# Patient Record
Sex: FEMALE | Race: WHITE | Employment: OTHER | URBAN - METROPOLITAN AREA
[De-identification: names, ages, dates, MRNs, and addresses within clinical notes are randomized per-mention and may not be internally consistent; named-entity substitution may affect disease eponyms.]

---

## 2024-08-19 ENCOUNTER — APPOINTMENT (OUTPATIENT)
Dept: CT IMAGING | Facility: HOSPITAL | Age: 74
End: 2024-08-19
Attending: EMERGENCY MEDICINE
Payer: MEDICAID

## 2024-08-19 ENCOUNTER — HOSPITAL ENCOUNTER (EMERGENCY)
Facility: HOSPITAL | Age: 74
Discharge: HOME | End: 2024-08-19
Attending: EMERGENCY MEDICINE
Payer: MEDICAID

## 2024-08-19 VITALS
SYSTOLIC BLOOD PRESSURE: 112 MMHG | DIASTOLIC BLOOD PRESSURE: 70 MMHG | TEMPERATURE: 97 F | BODY MASS INDEX: 27.97 KG/M2 | HEART RATE: 70 BPM | RESPIRATION RATE: 18 BRPM | HEIGHT: 62 IN | OXYGEN SATURATION: 98 % | WEIGHT: 152 LBS

## 2024-08-19 DIAGNOSIS — S00.93XA CONTUSION OF HEAD, UNSPECIFIED PART OF HEAD, INITIAL ENCOUNTER: Primary | ICD-10-CM

## 2024-08-19 DIAGNOSIS — S80.00XA CONTUSION OF KNEE, UNSPECIFIED LATERALITY, INITIAL ENCOUNTER: ICD-10-CM

## 2024-08-19 DIAGNOSIS — S20.219A CONTUSION OF CHEST WALL, UNSPECIFIED LATERALITY, INITIAL ENCOUNTER: ICD-10-CM

## 2024-08-19 PROCEDURE — 99284 EMERGENCY DEPT VISIT MOD MDM: CPT

## 2024-08-19 PROCEDURE — 70450 CT HEAD/BRAIN W/O DYE: CPT | Performed by: EMERGENCY MEDICINE

## 2024-08-19 RX ORDER — HYDROCHLOROTHIAZIDE 12.5 MG/1
12.5 TABLET ORAL DAILY
COMMUNITY

## 2024-08-19 RX ORDER — CHOLECALCIFEROL (VITAMIN D3) 50 MCG
TABLET ORAL
COMMUNITY

## 2024-08-19 RX ORDER — ROSUVASTATIN CALCIUM 5 MG/1
5 TABLET, COATED ORAL NIGHTLY
COMMUNITY

## 2024-08-19 RX ORDER — METFORMIN HYDROCHLORIDE EXTENDED-RELEASE TABLETS 1000 MG/1
1000 TABLET, FILM COATED, EXTENDED RELEASE ORAL
COMMUNITY

## 2024-08-19 RX ORDER — AMLODIPINE BESYLATE 5 MG/1
5 TABLET ORAL DAILY
COMMUNITY

## 2024-08-19 RX ORDER — ESCITALOPRAM OXALATE 10 MG/1
10 TABLET ORAL DAILY
COMMUNITY

## 2024-08-19 RX ORDER — ACETAMINOPHEN AND CODEINE PHOSPHATE 300; 30 MG/1; MG/1
1 TABLET ORAL ONCE
Status: COMPLETED | OUTPATIENT
Start: 2024-08-19 | End: 2024-08-19

## 2024-08-19 NOTE — ED INITIAL ASSESSMENT (HPI)
Pt arrives ambulatory to ED after fall three days ago on the sidewalk. Pt now c/o headache, bruising to R side of jaw, and bruising to chest. Pt states she tripped and fell, denies dizziness at the time of the fall. Pt also c/o pain to bilateral knees. Aox4, speaking in full sentences.     Daughter aiding to translate Andorran.

## 2024-08-20 NOTE — ED PROVIDER NOTES
Patient Seen in: [facility] Emergency Department    History     Chief Complaint   Patient presents with    Fall    Headache     Stated Complaint: fall    HPI    Patient here with complaint of head injury.  Injury occurred few days ago fell to ground .  no neck pain, no numbness, tingling or weakness.   no vomiting.  no amnesia.  no abnormal behavior.  Other injuries r chest wall and b knees.  + blood thinners.    Past Medical History:    Depression    Diabetes (HCC)    Essential hypertension    High cholesterol       History reviewed. No pertinent surgical history.         No family history on file.    Social History     Socioeconomic History    Marital status:    Tobacco Use    Smoking status: Never    Smokeless tobacco: Never     Social Determinants of Health     Financial Resource Strain: Medium Risk (4/18/2024)    Received from [facility]    Overall Financial Resource Strain (CARDIA)     Difficulty of Paying Living Expenses: Somewhat hard   Food Insecurity: No Food Insecurity (5/3/2024)    Received from [facility]    Food Insecurity     Within the past 30 days, I worried whether my food would run out before I got money to buy more. / En los últimos 30 días, me preocupó que la comida se podía acabar antes de tener dinero para compr...: Never true / Nunca     Within the past 30 days, the food that I bought just didn't last, and I didn't have money to get more. / En los últimos 30 días, La comida que compré no rindió lo suficiente, y no tenía dinero para...: Never true / Nunca   Transportation Needs: No Transportation Needs (11/18/2020)    Received from [facility]    PRAPARE - Transportation     Lack of Transportation (Medical): No     Lack of Transportation (Non-Medical): No   Physical Activity: Unknown (11/18/2020)    Received from [facility]    Exercise Vital Sign     Days of Exercise per Week: 0 days   Stress: No  Stress Concern Present (11/18/2020)    Received from [facility]    [employer] [place] [employer] - Occupational Stress Questionnaire     Feeling of Stress : Only a little   Social Connections: Unknown (11/18/2020)    Received from [facility]    Social Connection and Isolation Panel [NHANES]     Frequency of Communication with Friends and Family: More than three times a week    Received from [facility]    Housing Stability       Review of Systems    Positive for stated complaint: fall  Other systems are as noted in HPI.  Constitutional and vital signs reviewed.      All other systems reviewed and negative except as noted above.    PSFH elements reviewed from today and agreed except as otherwise stated in HPI.    Physical Exam     ED Triage Vitals [08/19/24 1535]   /79   Pulse 79   Resp 18   Temp 96.8 °F (36 °C)   Temp src Temporal   SpO2 98 %   O2 Device None (Room air)       Current:/70   Pulse 70   Temp 96.8 °F (36 °C) (Temporal)   Resp 18   Ht 157.5 cm (5' 2\")   Wt 68.9 kg   SpO2 98%   BMI 27.80 kg/m²   PULSE OX nl  General: Well appearing in no distress.  Head: r chin bruising noted, tender sup scalp,  no  Alvarado sign/raccoon eyes. no laceration  Eyes: Pupils equal round and reactive to light and accommodation. EOMI.  ENT: Oropharynx clear and patent without malocclusion of the jaw or dental injury.   Neck: neg NEXXUS   Back: no midline tenderness  Resp: no chest tenderness, no resp distres,   CV:  regular rate  Neuro/, A and O x 3.  Normal sensation to light touch in all extremities.  Cranial Nerves: Cranial nerves 2-12 intact  Cerebellar: Normal gait. Normal as tested  Extremities:b knees superficial sts and tenderness no bony deformity ext mech intact  Skin:  no laceration  Psych: Mood and affect normal          ED Course   Labs Reviewed - No data to display  CT BRAIN OR HEAD (CPT=70450)    Result Date:  8/19/2024  CONCLUSION:  1. Negative for depressed calvarial fracture, coup/contrecoup intraparenchymal contusion, intracranial hemorrhage, or further evidence of acute intracranial process by noncontrast CT technique.  2. Senescent changes of parenchymal volume loss with sequela of chronic microvascular ischemic disease. There is also large vessel atherosclerosis.  3. Lesser incidental findings as above.     Dictated by (CST): Jimi Arellano MD on 8/19/2024 at 6:35 PM     Finalized by (CST): Jimi Arellano MD on 8/19/2024 at 6:39 PM           I reviewed CT and noted no intracranial bleeding    MDM     Medical Decision Making  Problems Addressed:  Contusion of chest wall, unspecified laterality, initial encounter: acute illness or injury  Contusion of head, unspecified part of head, initial encounter: acute illness or injury  Contusion of knee, unspecified laterality, initial encounter: acute illness or injury    Amount and/or Complexity of Data Reviewed  Radiology: ordered and independent interpretation performed. Decision-making details documented in ED Course.  Discussion of management or test interpretation with external provider(s): Tylenol, motrin recommended.      Risk  OTC drugs.          Disposition and Plan     Clinical Impression:  1. Contusion of head, unspecified part of head, initial encounter    2. Contusion of chest wall, unspecified laterality, initial encounter    3. Contusion of knee, unspecified laterality, initial encounter        Disposition:  Discharge    Follow-up:  No follow-up provider specified.    Medications Prescribed:  Discharge Medication List as of 8/19/2024  6:58 PM

## 2025-02-17 ENCOUNTER — APPOINTMENT (OUTPATIENT)
Dept: CT IMAGING | Facility: HOSPITAL | Age: 75
End: 2025-02-17
Attending: EMERGENCY MEDICINE
Payer: MEDICAID

## 2025-02-17 ENCOUNTER — ANESTHESIA (OUTPATIENT)
Dept: SURGERY | Facility: HOSPITAL | Age: 75
End: 2025-02-17
Payer: MEDICAID

## 2025-02-17 ENCOUNTER — HOSPITAL ENCOUNTER (OUTPATIENT)
Facility: HOSPITAL | Age: 75
Setting detail: OBSERVATION
Discharge: HOME | End: 2025-02-18
Attending: EMERGENCY MEDICINE | Admitting: HOSPITALIST
Payer: MEDICAID

## 2025-02-17 ENCOUNTER — ANESTHESIA EVENT (OUTPATIENT)
Dept: SURGERY | Facility: HOSPITAL | Age: 75
End: 2025-02-17
Payer: MEDICAID

## 2025-02-17 DIAGNOSIS — K35.80 ACUTE APPENDICITIS, UNSPECIFIED ACUTE APPENDICITIS TYPE: Primary | ICD-10-CM

## 2025-02-17 LAB
ALBUMIN SERPL-MCNC: 4.4 G/DL (ref 3.2–4.8)
ALP LIVER SERPL-CCNC: 63 U/L
ALT SERPL-CCNC: 10 U/L
ANION GAP SERPL CALC-SCNC: 8 MMOL/L (ref 0–18)
AST SERPL-CCNC: 15 U/L (ref ?–34)
BASOPHILS # BLD AUTO: 0.04 X10(3) UL (ref 0–0.2)
BASOPHILS NFR BLD AUTO: 0.7 %
BILIRUB DIRECT SERPL-MCNC: 0.2 MG/DL (ref ?–0.3)
BILIRUB SERPL-MCNC: 0.6 MG/DL (ref 0.2–1.1)
BILIRUB UR QL: NEGATIVE
BUN BLD-MCNC: 15 MG/DL (ref 9–23)
BUN/CREAT SERPL: 24.2 (ref 10–20)
CALCIUM BLD-MCNC: 9.5 MG/DL (ref 8.7–10.4)
CHLORIDE SERPL-SCNC: 101 MMOL/L (ref 98–112)
CO2 SERPL-SCNC: 29 MMOL/L (ref 21–32)
COLOR UR: YELLOW
CREAT BLD-MCNC: 0.62 MG/DL
DEPRECATED RDW RBC AUTO: 43.5 FL (ref 35.1–46.3)
EGFRCR SERPLBLD CKD-EPI 2021: 95 ML/MIN/1.73M2 (ref 60–?)
EOSINOPHIL # BLD AUTO: 0.07 X10(3) UL (ref 0–0.7)
EOSINOPHIL NFR BLD AUTO: 1.2 %
ERYTHROCYTE [DISTWIDTH] IN BLOOD BY AUTOMATED COUNT: 13.1 % (ref 11–15)
EST. AVERAGE GLUCOSE BLD GHB EST-MCNC: 137 MG/DL (ref 68–126)
GLUCOSE BLD-MCNC: 126 MG/DL (ref 70–99)
GLUCOSE BLDC GLUCOMTR-MCNC: 103 MG/DL (ref 70–99)
GLUCOSE BLDC GLUCOMTR-MCNC: 130 MG/DL (ref 70–99)
GLUCOSE BLDC GLUCOMTR-MCNC: 87 MG/DL (ref 70–99)
GLUCOSE BLDC GLUCOMTR-MCNC: 94 MG/DL (ref 70–99)
GLUCOSE BLDC GLUCOMTR-MCNC: 95 MG/DL (ref 70–99)
GLUCOSE UR-MCNC: NORMAL MG/DL
HBA1C MFR BLD: 6.4 % (ref ?–5.7)
HCT VFR BLD AUTO: 38.1 %
HGB BLD-MCNC: 12.7 G/DL
HGB UR QL STRIP.AUTO: NEGATIVE
IMM GRANULOCYTES # BLD AUTO: 0.01 X10(3) UL (ref 0–1)
IMM GRANULOCYTES NFR BLD: 0.2 %
KETONES UR-MCNC: NEGATIVE MG/DL
LEUKOCYTE ESTERASE UR QL STRIP.AUTO: 25
LIPASE SERPL-CCNC: 30 U/L (ref 12–53)
LYMPHOCYTES # BLD AUTO: 1.22 X10(3) UL (ref 1–4)
LYMPHOCYTES NFR BLD AUTO: 20.8 %
MCH RBC QN AUTO: 30 PG (ref 26–34)
MCHC RBC AUTO-ENTMCNC: 33.3 G/DL (ref 31–37)
MCV RBC AUTO: 90.1 FL
MONOCYTES # BLD AUTO: 0.49 X10(3) UL (ref 0.1–1)
MONOCYTES NFR BLD AUTO: 8.4 %
NEUTROPHILS # BLD AUTO: 4.03 X10 (3) UL (ref 1.5–7.7)
NEUTROPHILS # BLD AUTO: 4.03 X10(3) UL (ref 1.5–7.7)
NEUTROPHILS NFR BLD AUTO: 68.7 %
NITRITE UR QL STRIP.AUTO: NEGATIVE
OSMOLALITY SERPL CALC.SUM OF ELEC: 288 MOSM/KG (ref 275–295)
PH UR: 8 [PH] (ref 5–8)
PLATELET # BLD AUTO: 252 10(3)UL (ref 150–450)
POTASSIUM SERPL-SCNC: 4.1 MMOL/L (ref 3.5–5.1)
PROT SERPL-MCNC: 7.1 G/DL (ref 5.7–8.2)
RBC # BLD AUTO: 4.23 X10(6)UL
SODIUM SERPL-SCNC: 138 MMOL/L (ref 136–145)
SP GR UR STRIP: 1.02 (ref 1–1.03)
UROBILINOGEN UR STRIP-ACNC: 2
WBC # BLD AUTO: 5.9 X10(3) UL (ref 4–11)

## 2025-02-17 PROCEDURE — 99223 1ST HOSP IP/OBS HIGH 75: CPT | Performed by: STUDENT IN AN ORGANIZED HEALTH CARE EDUCATION/TRAINING PROGRAM

## 2025-02-17 PROCEDURE — 99222 1ST HOSP IP/OBS MODERATE 55: CPT | Performed by: HOSPITALIST

## 2025-02-17 PROCEDURE — 0DTJ4ZZ RESECTION OF APPENDIX, PERCUTANEOUS ENDOSCOPIC APPROACH: ICD-10-PCS | Performed by: STUDENT IN AN ORGANIZED HEALTH CARE EDUCATION/TRAINING PROGRAM

## 2025-02-17 PROCEDURE — 74177 CT ABD & PELVIS W/CONTRAST: CPT | Performed by: EMERGENCY MEDICINE

## 2025-02-17 PROCEDURE — 44970 LAPAROSCOPY APPENDECTOMY: CPT | Performed by: STUDENT IN AN ORGANIZED HEALTH CARE EDUCATION/TRAINING PROGRAM

## 2025-02-17 RX ORDER — METRONIDAZOLE 500 MG/100ML
500 INJECTION, SOLUTION INTRAVENOUS ONCE
Status: DISCONTINUED | OUTPATIENT
Start: 2025-02-17 | End: 2025-02-17

## 2025-02-17 RX ORDER — ROCURONIUM BROMIDE 10 MG/ML
INJECTION, SOLUTION INTRAVENOUS AS NEEDED
Status: DISCONTINUED | OUTPATIENT
Start: 2025-02-17 | End: 2025-02-17 | Stop reason: SURG

## 2025-02-17 RX ORDER — MORPHINE SULFATE 10 MG/ML
6 INJECTION, SOLUTION INTRAMUSCULAR; INTRAVENOUS EVERY 10 MIN PRN
Status: DISCONTINUED | OUTPATIENT
Start: 2025-02-17 | End: 2025-02-17 | Stop reason: HOSPADM

## 2025-02-17 RX ORDER — METOCLOPRAMIDE HYDROCHLORIDE 5 MG/ML
10 INJECTION INTRAMUSCULAR; INTRAVENOUS EVERY 8 HOURS PRN
Status: DISCONTINUED | OUTPATIENT
Start: 2025-02-17 | End: 2025-02-18

## 2025-02-17 RX ORDER — DEXAMETHASONE SODIUM PHOSPHATE 4 MG/ML
VIAL (ML) INJECTION AS NEEDED
Status: DISCONTINUED | OUTPATIENT
Start: 2025-02-17 | End: 2025-02-17 | Stop reason: SURG

## 2025-02-17 RX ORDER — NICOTINE POLACRILEX 4 MG
30 LOZENGE BUCCAL
Status: DISCONTINUED | OUTPATIENT
Start: 2025-02-17 | End: 2025-02-18

## 2025-02-17 RX ORDER — ONDANSETRON 2 MG/ML
4 INJECTION INTRAMUSCULAR; INTRAVENOUS EVERY 6 HOURS PRN
Status: DISCONTINUED | OUTPATIENT
Start: 2025-02-17 | End: 2025-02-17 | Stop reason: HOSPADM

## 2025-02-17 RX ORDER — ONDANSETRON 2 MG/ML
4 INJECTION INTRAMUSCULAR; INTRAVENOUS EVERY 6 HOURS PRN
Status: DISCONTINUED | OUTPATIENT
Start: 2025-02-17 | End: 2025-02-18

## 2025-02-17 RX ORDER — ONDANSETRON 2 MG/ML
4 INJECTION INTRAMUSCULAR; INTRAVENOUS ONCE
Status: COMPLETED | OUTPATIENT
Start: 2025-02-17 | End: 2025-02-17

## 2025-02-17 RX ORDER — OXYCODONE HYDROCHLORIDE 5 MG/1
5 TABLET ORAL EVERY 4 HOURS PRN
Qty: 5 TABLET | Refills: 0 | Status: SHIPPED | OUTPATIENT
Start: 2025-02-17

## 2025-02-17 RX ORDER — ESCITALOPRAM OXALATE 10 MG/1
10 TABLET ORAL DAILY
Status: DISCONTINUED | OUTPATIENT
Start: 2025-02-17 | End: 2025-02-18

## 2025-02-17 RX ORDER — AMLODIPINE BESYLATE 5 MG/1
5 TABLET ORAL DAILY
Status: DISCONTINUED | OUTPATIENT
Start: 2025-02-17 | End: 2025-02-18

## 2025-02-17 RX ORDER — LIDOCAINE HYDROCHLORIDE 10 MG/ML
INJECTION, SOLUTION EPIDURAL; INFILTRATION; INTRACAUDAL; PERINEURAL AS NEEDED
Status: DISCONTINUED | OUTPATIENT
Start: 2025-02-17 | End: 2025-02-17 | Stop reason: SURG

## 2025-02-17 RX ORDER — MORPHINE SULFATE 4 MG/ML
4 INJECTION, SOLUTION INTRAMUSCULAR; INTRAVENOUS EVERY 10 MIN PRN
Status: DISCONTINUED | OUTPATIENT
Start: 2025-02-17 | End: 2025-02-17 | Stop reason: HOSPADM

## 2025-02-17 RX ORDER — NICOTINE POLACRILEX 4 MG
15 LOZENGE BUCCAL
Status: DISCONTINUED | OUTPATIENT
Start: 2025-02-17 | End: 2025-02-18

## 2025-02-17 RX ORDER — HYDROMORPHONE HYDROCHLORIDE 1 MG/ML
0.4 INJECTION, SOLUTION INTRAMUSCULAR; INTRAVENOUS; SUBCUTANEOUS EVERY 5 MIN PRN
Status: DISCONTINUED | OUTPATIENT
Start: 2025-02-17 | End: 2025-02-17 | Stop reason: HOSPADM

## 2025-02-17 RX ORDER — NALOXONE HYDROCHLORIDE 0.4 MG/ML
80 INJECTION, SOLUTION INTRAMUSCULAR; INTRAVENOUS; SUBCUTANEOUS AS NEEDED
Status: DISCONTINUED | OUTPATIENT
Start: 2025-02-17 | End: 2025-02-17 | Stop reason: HOSPADM

## 2025-02-17 RX ORDER — MORPHINE SULFATE 2 MG/ML
2 INJECTION, SOLUTION INTRAMUSCULAR; INTRAVENOUS EVERY 2 HOUR PRN
Status: DISCONTINUED | OUTPATIENT
Start: 2025-02-17 | End: 2025-02-17

## 2025-02-17 RX ORDER — ACETAMINOPHEN 500 MG
500 TABLET ORAL EVERY 4 HOURS PRN
Status: DISCONTINUED | OUTPATIENT
Start: 2025-02-17 | End: 2025-02-18

## 2025-02-17 RX ORDER — ACETAMINOPHEN 500 MG
750 TABLET ORAL EVERY 8 HOURS
Status: DISCONTINUED | OUTPATIENT
Start: 2025-02-17 | End: 2025-02-18

## 2025-02-17 RX ORDER — HYDROMORPHONE HYDROCHLORIDE 1 MG/ML
0.3 INJECTION, SOLUTION INTRAMUSCULAR; INTRAVENOUS; SUBCUTANEOUS EVERY 4 HOURS PRN
Status: DISCONTINUED | OUTPATIENT
Start: 2025-02-17 | End: 2025-02-18

## 2025-02-17 RX ORDER — ACETAMINOPHEN 500 MG
750 TABLET ORAL EVERY 4 HOURS PRN
Qty: 40 TABLET | Refills: 0 | Status: SHIPPED | OUTPATIENT
Start: 2025-02-17

## 2025-02-17 RX ORDER — MORPHINE SULFATE 4 MG/ML
2 INJECTION, SOLUTION INTRAMUSCULAR; INTRAVENOUS EVERY 10 MIN PRN
Status: DISCONTINUED | OUTPATIENT
Start: 2025-02-17 | End: 2025-02-17 | Stop reason: HOSPADM

## 2025-02-17 RX ORDER — HYDROMORPHONE HYDROCHLORIDE 1 MG/ML
0.6 INJECTION, SOLUTION INTRAMUSCULAR; INTRAVENOUS; SUBCUTANEOUS EVERY 5 MIN PRN
Status: DISCONTINUED | OUTPATIENT
Start: 2025-02-17 | End: 2025-02-17 | Stop reason: HOSPADM

## 2025-02-17 RX ORDER — MEPERIDINE HYDROCHLORIDE 25 MG/ML
12.5 INJECTION INTRAMUSCULAR; INTRAVENOUS; SUBCUTANEOUS AS NEEDED
Status: DISCONTINUED | OUTPATIENT
Start: 2025-02-17 | End: 2025-02-17 | Stop reason: HOSPADM

## 2025-02-17 RX ORDER — OXYCODONE HYDROCHLORIDE 5 MG/1
5 TABLET ORAL EVERY 4 HOURS PRN
Status: DISCONTINUED | OUTPATIENT
Start: 2025-02-17 | End: 2025-02-18

## 2025-02-17 RX ORDER — IBUPROFEN 200 MG
400 TABLET ORAL EVERY 6 HOURS PRN
COMMUNITY
End: 2025-02-18

## 2025-02-17 RX ORDER — METOCLOPRAMIDE HYDROCHLORIDE 5 MG/ML
10 INJECTION INTRAMUSCULAR; INTRAVENOUS EVERY 8 HOURS PRN
Status: DISCONTINUED | OUTPATIENT
Start: 2025-02-17 | End: 2025-02-17 | Stop reason: HOSPADM

## 2025-02-17 RX ORDER — HYDROMORPHONE HYDROCHLORIDE 1 MG/ML
0.2 INJECTION, SOLUTION INTRAMUSCULAR; INTRAVENOUS; SUBCUTANEOUS EVERY 5 MIN PRN
Status: DISCONTINUED | OUTPATIENT
Start: 2025-02-17 | End: 2025-02-17 | Stop reason: HOSPADM

## 2025-02-17 RX ORDER — SODIUM CHLORIDE, SODIUM LACTATE, POTASSIUM CHLORIDE, CALCIUM CHLORIDE 600; 310; 30; 20 MG/100ML; MG/100ML; MG/100ML; MG/100ML
INJECTION, SOLUTION INTRAVENOUS CONTINUOUS
Status: DISCONTINUED | OUTPATIENT
Start: 2025-02-17 | End: 2025-02-17 | Stop reason: HOSPADM

## 2025-02-17 RX ORDER — OMEGA-3 FATTY ACIDS/FISH OIL 300-1000MG
400 CAPSULE ORAL EVERY 6 HOURS PRN
Qty: 40 CAPSULE | Refills: 0 | Status: SHIPPED | OUTPATIENT
Start: 2025-02-17 | End: 2025-03-19

## 2025-02-17 RX ORDER — TEMAZEPAM 15 MG/1
15 CAPSULE ORAL NIGHTLY PRN
Status: DISCONTINUED | OUTPATIENT
Start: 2025-02-17 | End: 2025-02-18

## 2025-02-17 RX ORDER — SODIUM CHLORIDE, SODIUM LACTATE, POTASSIUM CHLORIDE, CALCIUM CHLORIDE 600; 310; 30; 20 MG/100ML; MG/100ML; MG/100ML; MG/100ML
INJECTION, SOLUTION INTRAVENOUS CONTINUOUS PRN
Status: DISCONTINUED | OUTPATIENT
Start: 2025-02-17 | End: 2025-02-17 | Stop reason: SURG

## 2025-02-17 RX ORDER — ROSUVASTATIN CALCIUM 5 MG/1
5 TABLET, COATED ORAL NIGHTLY
Status: DISCONTINUED | OUTPATIENT
Start: 2025-02-17 | End: 2025-02-18

## 2025-02-17 RX ORDER — BUPIVACAINE HYDROCHLORIDE AND EPINEPHRINE 2.5; 5 MG/ML; UG/ML
INJECTION, SOLUTION INFILTRATION; PERINEURAL AS NEEDED
Status: DISCONTINUED | OUTPATIENT
Start: 2025-02-17 | End: 2025-02-17 | Stop reason: HOSPADM

## 2025-02-17 RX ORDER — SODIUM CHLORIDE 9 MG/ML
INJECTION, SOLUTION INTRAVENOUS CONTINUOUS
Status: DISCONTINUED | OUTPATIENT
Start: 2025-02-17 | End: 2025-02-18

## 2025-02-17 RX ORDER — METRONIDAZOLE 500 MG/100ML
500 INJECTION, SOLUTION INTRAVENOUS EVERY 12 HOURS
Status: DISCONTINUED | OUTPATIENT
Start: 2025-02-17 | End: 2025-02-17

## 2025-02-17 RX ORDER — MORPHINE SULFATE 2 MG/ML
1 INJECTION, SOLUTION INTRAMUSCULAR; INTRAVENOUS EVERY 2 HOUR PRN
Status: DISCONTINUED | OUTPATIENT
Start: 2025-02-17 | End: 2025-02-17

## 2025-02-17 RX ORDER — SODIUM CHLORIDE 9 MG/ML
INJECTION, SOLUTION INTRAVENOUS ONCE
Status: COMPLETED | OUTPATIENT
Start: 2025-02-17 | End: 2025-02-18

## 2025-02-17 RX ORDER — DEXTROSE MONOHYDRATE 25 G/50ML
50 INJECTION, SOLUTION INTRAVENOUS
Status: DISCONTINUED | OUTPATIENT
Start: 2025-02-17 | End: 2025-02-18

## 2025-02-17 RX ORDER — MORPHINE SULFATE 4 MG/ML
4 INJECTION, SOLUTION INTRAMUSCULAR; INTRAVENOUS EVERY 2 HOUR PRN
Status: DISCONTINUED | OUTPATIENT
Start: 2025-02-17 | End: 2025-02-17

## 2025-02-17 RX ADMIN — ROCURONIUM BROMIDE 50 MG: 10 INJECTION, SOLUTION INTRAVENOUS at 19:29:00

## 2025-02-17 RX ADMIN — LIDOCAINE HYDROCHLORIDE 25 MG: 10 INJECTION, SOLUTION EPIDURAL; INFILTRATION; INTRACAUDAL; PERINEURAL at 19:25:00

## 2025-02-17 RX ADMIN — ONDANSETRON 4 MG: 2 INJECTION INTRAMUSCULAR; INTRAVENOUS at 19:39:00

## 2025-02-17 RX ADMIN — DEXAMETHASONE SODIUM PHOSPHATE 4 MG: 4 MG/ML VIAL (ML) INJECTION at 19:39:00

## 2025-02-17 RX ADMIN — SODIUM CHLORIDE, SODIUM LACTATE, POTASSIUM CHLORIDE, CALCIUM CHLORIDE: 600; 310; 30; 20 INJECTION, SOLUTION INTRAVENOUS at 19:21:00

## 2025-02-17 NOTE — ED PROVIDER NOTES
Patient Seen in: [facility] Emergency Department    History     Chief Complaint   Patient presents with    Abdomen/Flank Pain     Stated Complaint: abd pain    HPI      History of present illness:   Pt complains of RLQ pain that began 3 days ago.  Pain rated as 7/10, sharp.  + nausea. No vomiting or diarrhea.  No fever.  Decreased appetitie.      Past Medical History:    Depression    Diabetes (HCC)    Essential hypertension    High cholesterol    Osteoarthritis       Past Surgical History:   Procedure Laterality Date    Total knee replacement Left 2020            Family History   Problem Relation Age of Onset    Cancer Father         skin cancer    Diabetes Mother     Hypertension Sister     Diabetes Sister     Diabetes Brother        Social History     Socioeconomic History    Marital status:    Tobacco Use    Smoking status: Never    Smokeless tobacco: Never   Substance and Sexual Activity    Drug use: Not Currently     Social Drivers of Health     Food Insecurity: No Food Insecurity (2/17/2025)    NCSS - Food Insecurity     Worried About Running Out of Food in the Last Year: No     Ran Out of Food in the Last Year: No   Transportation Needs: No Transportation Needs (2/17/2025)    NCSS - Transportation     Lack of Transportation: No   Housing Stability: Not At Risk (2/17/2025)    NCSS - Housing/Utilities     Has Housing: Yes     Worried About Losing Housing: No     Unable to Get Utilities: No       Review of Systems    Positive for stated complaint: abd pain  Other systems are as noted in HPI.  Constitutional and vital signs reviewed.      All other systems reviewed and negative except as noted above.    PSFH elements reviewed from today and agreed except as otherwise stated in HPI.    Physical Exam     ED Triage Vitals [02/17/25 1006]   /88   Pulse 82   Resp 18   Temp 97.7 °F (36.5 °C)   Temp src Oral   SpO2 98 %   O2 Device None (Room air)       Current:/59 (BP Location: Right arm)    Pulse 72   Temp 98.2 °F (36.8 °C) (Oral)   Resp 18   Ht 157.5 cm (5' 2\")   Wt 62.6 kg   SpO2 96%   BMI 25.24 kg/m²             General Appearance: alert, no distress  Eyes: pupils equal and round no pallor or injection  ENT, Mouth: mucous membranes moist  Respiratory: there are no retractions, lungs are clear to auscultation  Cardiovascular: regular rate and rhythm    Gastrointestinal:  soft, nondistended, tender over McBurney's, mild voluntary guarding, no masses normal bowel sounds   Neurological: II-XII grossly intact  no focal deficits  Skin: warm and dry, no rashes.  Musculoskeletal: neck is supple non tender        Extremities are symmetrical, full range of motion  Psychiatric: patient is pleasant, there is no agitation    DIFFERENTIAL DIAGNOSIS: After history and physical exam differential diagnosis was considered for mesenteric adenitis versus appendicitis versus colitis       ED Course     Labs Reviewed   BASIC METABOLIC PANEL (8) - Abnormal; Notable for the following components:       Result Value    Glucose 126 (*)     BUN/CREA Ratio 24.2 (*)     All other components within normal limits   URINALYSIS WITH CULTURE REFLEX - Abnormal; Notable for the following components:    Clarity Urine Turbid (*)     Protein Urine Trace (*)     Urobilinogen Urine 2 (*)     Leukocyte Esterase Urine 25 (*)     Squamous Epi. Cells Few (*)     All other components within normal limits   HEMOGLOBIN A1C - Abnormal; Notable for the following components:    HgbA1C 6.4 (*)     Estimated Average Glucose 137 (*)     All other components within normal limits   HEPATIC FUNCTION PANEL (7) - Normal   LIPASE - Normal   POCT GLUCOSE - Normal   POCT GLUCOSE - Normal   POCT GLUCOSE - Normal   CBC WITH DIFFERENTIAL WITH PLATELET   URINE CULTURE, ROUTINE   C. DIFFICILE(TOXIGENIC)PCR       MDM         Monitor Interpretation:  Nsr 78    Radiology Interpretation:  No results found.  CT acute appendicitis    Medical Decision  Making  Problems Addressed:  Acute appendicitis, unspecified acute appendicitis type: acute illness or injury    Amount and/or Complexity of Data Reviewed  Radiology: ordered. Decision-making details documented in ED Course.  Discussion of management or test interpretation with external provider(s): Dr Arreguin in ER for admission, spoke with gen surgery abx ordered    Risk  Decision regarding hospitalization.        Disposition and Plan     Clinical Impression:  1. Acute appendicitis, unspecified acute appendicitis type        Disposition:  Admit    Follow-up:  [facility]  [address]  [facility] [address]  [phone]    Follow up in 2 week(s)        Medications Prescribed:  Current Discharge Medication List          Present on Admission:  **None**

## 2025-02-17 NOTE — H&P
[facility]    PATIENT'S NAME: LEONARDA GALVEZ   ATTENDING PHYSICIAN: Vince Ta MD   PATIENT ACCOUNT#:   [id number]    LOCATION:  [address]  MEDICAL RECORD #:   [id number]       YOB: 1950  ADMISSION DATE:       02/17/2025    HISTORY AND PHYSICAL EXAMINATION    CHIEF COMPLAINT:  Acute appendicitis.    HISTORY OF PRESENT ILLNESS:  Patient is a 74-year-old  female who came into the emergency department for evaluation of 3 days of right lower quadrant abdominal pain.  CBC, chemistry, urinalysis, and liver function tests were unremarkable.  CT scan of the abdomen and pelvis showed acute uncomplicated appendicitis.  Started on IV antibiotics, and she will be admitted to the hospital for further management.     PAST MEDICAL HISTORY:  Hypertension, hyperlipidemia, diabetes mellitus type 2, osteoarthritis, depression.    PAST SURGICAL HISTORY:  None.    MEDICATIONS:  Please see medication reconciliation list.     ALLERGIES:  No known drug allergies.    FAMILY HISTORY:  Positive for diabetes mellitus type 2 and hypertension.    SOCIAL HISTORY:  No tobacco or drug use.  Occasional alcohol.  Independent for basic activities of daily living.     REVIEW OF SYSTEMS:  Right lower quadrant abdominal pain initially started in the mid abdomen and lateralized to the right lower quadrant yesterday.  No fever or chills but she had malaise, fatigue, and decreased appetite.  Other 12-point review of systems is negative.       PHYSICAL EXAMINATION:    GENERAL:  Alert and oriented to time, place and person.  Anxious.  Mild distress.   VITAL SIGNS:  Temperature 97.7, pulse 82, respiratory rate 18, blood pressure 142/88, pulse ox 98% on room air.   HEENT:  Atraumatic.  Oropharynx clear.  Moist mucous membranes.  Ears and nose normal.  Eyes:  Anicteric sclerae.   NECK:  Supple.  No lymphadenopathy.  Trachea midline.  Full range of motion.   LUNGS:  Clear to auscultation bilaterally.  Normal  respiratory effort.    HEART:  Regular rate and rhythm.  S1 and S2 auscultated.  No murmur.    ABDOMEN:  Soft, nondistended.  Tenderness noted, right lower quadrant area.  No guarding or rebound tenderness.  EXTREMITIES:  No peripheral edema, clubbing or cyanosis.   NEUROLOGIC:  Motor and sensory intact.      ASSESSMENT:    1.   Acute appendicitis, uncomplicated.  2.   Diabetes mellitus type 2.  3.   Hypertension.    PLAN:  Patient will be admitted to general medical floor.  IV antibiotics, Rocephin and Flagyl.  Pain and nausea control.  N.p.o.  General surgery consult to evaluate patient for laparoscopic appendectomy.  Monitor Accu-Cheks.  Further recommendations to follow.      Dictated By Иван Arreguin MD  d: 02/17/2025 13:25:33  t: 02/17/2025 14:16:47  [facility] [id number]  FB/

## 2025-02-17 NOTE — ED INITIAL ASSESSMENT (HPI)
Pt having RLQ abd pain radiating to LT side abd and epigastric, fevers and n/v starting on Friday.

## 2025-02-17 NOTE — CONSULTS
[facility]  Report of Consultation    Kristin Hebert Patient Status:  Emergency    1950 MRN [id number]   Location [facility] EMERGENCY DEPARTMENT Attending Vince Ta MD   Hosp Day # 0 PCP Cody Llamas     Reason for Consultation:  Acute appendicitis    History of Present Illness:  Kristin Hebert is a 74 year old female who presents to [facility] on 2025 with complaints of RLQ abdominal pain associated with nausea and vomiting. She has had constant pain for the past 3 days. She reports feeling warm but never checked her temperature at home. She hasn't felt a pain like this before. CT A/P consistent with acute appendicitis. WBC 5.9, afebrile.     Past medical history HTN, DM    Past abdominal surgical history none    History:  Past Medical History:    Depression    Diabetes (HCC)    Essential hypertension    High cholesterol     History reviewed. No pertinent surgical history.  History reviewed. No pertinent family history.   reports that she has never smoked. She has never used smokeless tobacco.    Allergies:  Allergies[1]    Medications:  (Not in a hospital admission)        Current Facility-Administered Medications:     sodium chloride 0.9% infusion, , Intravenous, Once    cefTRIAXone (Rocephin) 2 g in sodium chloride 0.9% 100 mL IVPB-ADDV, 2 g, Intravenous, Once    metroNIDAZOLE in sodium chloride 0.79% (Flagyl) 5 mg/mL IVPB premix 500 mg, 500 mg, Intravenous, Once    Review of Systems:  Review of Systems   Constitutional:  Positive for appetite change. Negative for chills, diaphoresis and fever.   Respiratory:  Negative for shortness of breath.    Cardiovascular:  Negative for chest pain.   Gastrointestinal:  Positive for abdominal pain. Negative for nausea, vomiting, diarrhea, constipation and abdominal distention.   Neurological:  Negative for weakness.       Physical Exam:  /88   Pulse 82   Temp 97.7 °F (36.5 °C) (Oral)   Resp 18   Ht 5' 2\"  (1.575 m)   Wt 151 lb 14.4 oz (68.9 kg)   SpO2 98%   BMI 27.78 kg/m²   Physical Exam  Vitals reviewed.   Constitutional:       General: She is not in acute distress.     Appearance: Normal appearance.   HENT:      Head: Normocephalic and atraumatic.      Right Ear: External ear normal.      Left Ear: External ear normal.   Pulmonary:      Effort: Pulmonary effort is normal. No respiratory distress.   Abdominal:      General: There is no distension.      Palpations: Abdomen is soft.      Tenderness: There is abdominal tenderness (RLQ). There is no guarding or rebound.   Neurological:      Mental Status: She is alert and oriented to person, place, and time.   Psychiatric:         Mood and Affect: Mood normal.         Behavior: Behavior normal.         Thought Content: Thought content normal.         Judgment: Judgment normal.         Laboratory Data:  Recent Labs   Lab 02/17/25  1119   RBC 4.23   HGB 12.7   HCT 38.1   MCV 90.1   MCH 30.0   MCHC 33.3   RDW 13.1   NEPRELIM 4.03   WBC 5.9   .0       Recent Labs   Lab 02/17/25  1119   *   BUN 15   CREATSERUM 0.62   CA 9.5   ALB 4.4      K 4.1      CO2 29.0   ALKPHO 63   AST 15   ALT 10   BILT 0.6   TP 7.1         No results for input(s): \"PTP\", \"INR\", \"PTT\" in the last 168 hours.      No results found.    Medical Decision Making         Impression:  Patient Active Problem List   Diagnosis    Acute appendicitis, unspecified acute appendicitis type       Acute appendicitis    Plan:  Plan laparoscopic appendectomy pending OR availability  Keep NPO prior to procedure  Continue IV abx  Analgesics and antiemetics as needed  Dr. Lee to see and provide additional recommendations as needed    Wenceslao Whatley PA-C  2/17/2025  1:28 PM                           [1] No Known Allergies

## 2025-02-18 VITALS
HEART RATE: 71 BPM | WEIGHT: 138 LBS | HEIGHT: 62 IN | BODY MASS INDEX: 25.4 KG/M2 | OXYGEN SATURATION: 98 % | RESPIRATION RATE: 18 BRPM | SYSTOLIC BLOOD PRESSURE: 120 MMHG | TEMPERATURE: 98 F | DIASTOLIC BLOOD PRESSURE: 70 MMHG

## 2025-02-18 LAB
ANION GAP SERPL CALC-SCNC: 11 MMOL/L (ref 0–18)
BASOPHILS # BLD AUTO: 0.02 X10(3) UL (ref 0–0.2)
BASOPHILS NFR BLD AUTO: 0.3 %
BUN BLD-MCNC: 9 MG/DL (ref 9–23)
BUN/CREAT SERPL: 16.4 (ref 10–20)
CALCIUM BLD-MCNC: 9.1 MG/DL (ref 8.7–10.4)
CHLORIDE SERPL-SCNC: 102 MMOL/L (ref 98–112)
CO2 SERPL-SCNC: 24 MMOL/L (ref 21–32)
CREAT BLD-MCNC: 0.55 MG/DL
DEPRECATED RDW RBC AUTO: 41.1 FL (ref 35.1–46.3)
EGFRCR SERPLBLD CKD-EPI 2021: 97 ML/MIN/1.73M2 (ref 60–?)
EOSINOPHIL # BLD AUTO: 0 X10(3) UL (ref 0–0.7)
EOSINOPHIL NFR BLD AUTO: 0 %
ERYTHROCYTE [DISTWIDTH] IN BLOOD BY AUTOMATED COUNT: 12.7 % (ref 11–15)
GLUCOSE BLD-MCNC: 132 MG/DL (ref 70–99)
GLUCOSE BLDC GLUCOMTR-MCNC: 127 MG/DL (ref 70–99)
GLUCOSE BLDC GLUCOMTR-MCNC: 180 MG/DL (ref 70–99)
GLUCOSE BLDC GLUCOMTR-MCNC: 229 MG/DL (ref 70–99)
HCT VFR BLD AUTO: 36.2 %
HGB BLD-MCNC: 12.5 G/DL
IMM GRANULOCYTES # BLD AUTO: 0.02 X10(3) UL (ref 0–1)
IMM GRANULOCYTES NFR BLD: 0.3 %
LYMPHOCYTES # BLD AUTO: 0.93 X10(3) UL (ref 1–4)
LYMPHOCYTES NFR BLD AUTO: 13.4 %
MCH RBC QN AUTO: 30.5 PG (ref 26–34)
MCHC RBC AUTO-ENTMCNC: 34.5 G/DL (ref 31–37)
MCV RBC AUTO: 88.3 FL
MONOCYTES # BLD AUTO: 0.19 X10(3) UL (ref 0.1–1)
MONOCYTES NFR BLD AUTO: 2.7 %
NEUTROPHILS # BLD AUTO: 5.8 X10 (3) UL (ref 1.5–7.7)
NEUTROPHILS # BLD AUTO: 5.8 X10(3) UL (ref 1.5–7.7)
NEUTROPHILS NFR BLD AUTO: 83.3 %
OSMOLALITY SERPL CALC.SUM OF ELEC: 285 MOSM/KG (ref 275–295)
PLATELET # BLD AUTO: 268 10(3)UL (ref 150–450)
POTASSIUM SERPL-SCNC: 4 MMOL/L (ref 3.5–5.1)
RBC # BLD AUTO: 4.1 X10(6)UL
SODIUM SERPL-SCNC: 137 MMOL/L (ref 136–145)
WBC # BLD AUTO: 7 X10(3) UL (ref 4–11)

## 2025-02-18 PROCEDURE — 99239 HOSP IP/OBS DSCHRG MGMT >30: CPT | Performed by: HOSPITALIST

## 2025-02-18 NOTE — DISCHARGE INSTRUCTIONS
INSTRUCCIONES POSTOPERATORIAS PARA CIRUGÍA LAPAROSCÓPICA/ROBOTIZADA  Muchas dilshad por permitirme participar en zuniga atención, es un verdadero privilegio. A continuación, encontrará las órdenes de lebron que le ayudarán bethany zuniga recuperación. No dude en llamar a la oficina al [phone] si tiene alguna pregunta. Después de horas, el mismo número le permitirá comunicarse con el cirujano de james.    o Llame a la oficina [phone] para programar zuniga sascha de seguimiento postoperatorio con luis armando Lee para dentro de 2 semanas, si aún no está programada.    o (Puede ser necesario que lo cristy antes de 2 semanas si tiene puntos y/o drenajes presentes)    o  Cambie los vendajes diariamente o con más frecuencia si es necesario.    o Mantenga las incisiones limpias con jabón y agua a diario.    o Cubra la(s) incisión(es) según sea necesario.    o Por favor, retire las tiras adhesivas (Steri-Strips) 5 días después de la cirugía. Cucumber incluye cualquier vendaje transparente y gasa colocada en el ombligo, si es aplicable.    o Si tiene pegamento para la piel, gera se caerá solo.    o  Puede colocar keny bolsa de hielo sobre melvin incisiones de manera intermitente (15 minutos) bethany las próximas 24-48 horas.    o  Reanude zuniga dieta habitual según lo tolere (se recomienda comenzar con líquidos).    o  Pautas generales para la actividad:    - Evite actividades extenuantes o levantar objetos que pesen más de 15 libras.     - Está destiny levantarse y caminar. También está destiny subir y bajar escaleras.    - Zach lo que le resulte cómodo: deténgase y descanse cuando se sienta cansado.    O Está destiny ducharse después de 24 horas.    o Tendrá medicamentos para el dolor prescritos. Tómelo según las indicaciones/necesidades.    o Algunas molestias, moretones leves e hinchazón no son inusuales; por favor llame a mi oficina si tiene un dolor severo, sangrado o fiebre lebron (superior a 101°F).    o Behtany el procedimiento  robótico/laparoscópico que tuvo, se introduce gas en la cavidad abdominal. Puede sentir dolor abdominal, en los hombros o en las costillas bethany unos días debido a esto.    o Reanude melvin medicamentos habituales (consulte la hoja de conciliación de medicamentos).    o NO conduzca bethany un día y mientras esté tomando zuniga medicamento narcótico para el dolor.    O Observe los signos de infección:   - Calor excesivo o enrojecimiento brillante alrededor de melvin incisiones.   - Filtración de líquido sanguinolento o turbio de melvin incisiones.   - Fiebre superior a 100.5.   - Si experimenta estreñimiento:    o Aumente zuniga ingesta de agua.    o Aumente zuniga actividad; caminar es lo mejor.    o Un ablandador de heces de venta bhargav o un laxante suave pueden ser necesarios si no ha tenido keny evacuación intestinal después de varios días.    Por favor, llame a la oficina al [phone] si tiene alguna pregunta y para programar zuniga sascha postoperatoria si es necesario. Elisa nuevamente por permitirme participar en zuniga atención, ¡y que se recupere pronto!        Kaitlin Lee MD  [facility] - [address]  p [phone]      POST-OPERATIVE INSTRUCTIONS LAPAROSCOPIC/ROBOTIC SURGERY    Thank you very much for allowing me to participate in your care, it is truly a privilege. Below please see discharge orders that will help you during your recovery. Please do not hesitate to call the office at [phone] for any questions. After hours, the same number will allow you to reach the on-call surgeon.     Call the office [phone] to schedule your 2 week post-operative appointment with Dr. Lee or her Physician Assistant (PA).    Change bandages daily or more frequently if needed.  Keep incisions clean with soap/ water daily.   Cover incision(s) as needed.  If you have skin glue this will come off on its own    May place an ice pack over your incisions on and off (15min) at a  time for the next 24-48 hours.    Resume regular diet as tolerated (recommend starting with liquids)    General guidelines for activity:      Avoid strenuous activity or lifting anything heavier than 15 pounds.    It is OK to be up and walking around. Going up and down stairs is also OK.    Do what is comfortable: stop and rest when you feel tired.    It is OK to shower after 24 hours    You will have pain medicine ordered. Take as directed/needed.    Some discomfort, mild bruising, and swelling are not unusual; please call my office if you have any severe pain, bleeding, or high fever (over 101°F)    During the robotic/laparoscopic procedure that you had, gas is pumped into the abdominal cavity.  You may feel abdominal, shoulder, or rib pain for a few days due to this.    Resume home medications (see medication reconciliation sheet)       Do NOT drive for one day and while taking your narcotic pain medicine.        Watch for signs of infection:    Excessive warmth or bright redness around your incisions    Leakage of bloody or cloudy fluid from you incisions    Fever over 100.5    If you experience constipation  Increase your water intake.  Increase your activity; walking is best.  An over the counter stool softener or mild laxative may be necessary if you still have not had a bowel movement after several days.       Please call the office at [phone] for any questions and to make your post op appointment if needed. Thank you again for allowing me to participate in your care, and get well soon!      Kaitlin Lee MD  [facility] - General Surgery   [address]  p [phone]

## 2025-02-18 NOTE — OPERATIVE REPORT
OPERATIVE NOTE    PATIENT NAME: Kristin Hebert    :  1950    MRN:  [id number]  ATTENDING PHYSICIAN:  Иван Arreguin MD    PROCEDURE DATE:  2025       PREOPERATIVE DIAGNOSIS: Acute appendicitis     POSTOPERATIVE DIAGNOSIS: Acute suppurative appendicitis     SURGEON: Kaitlin Lee MD    ASSISTANT: PRADIP Bruno     OPERATION: 1) Laparoscopic appendectomy    ANESTHESIA: General    ESTIMATED BLOOD LOSS:  5 ml    COMPLICATIONS: none     SPECIMENS: Appendix    INDICATIONS: The patient is a 74 year old year old female with history of above preop diagnosis.  I explained the risk, benefits, expected outcome, and alternatives to the procedure. Patient understands the risks include but not inclusive to bleeding, infection, anesthesia complication, blood vessel/nerve damage, chronic pain, reoperation, and failure of the procedure to obtain its intended goals.  Patient understands and is in agreement and would like to proceed.     DESCRIPTION OF PROCEDURE:    Patient was taken to the operating room and underwnet general endotracheal anesthesia. Patient was placed in the supine position with the left arm tucked. Bony prominences were protected on the operating table. The abdomen was prepped and draped in the usual standard sterile fashion.  An appropriate timeout protocol was completed verifying patient and correct procedures to be completed.  All parties agreed.      A small skin incision was made at Bryan's point in the left upper quadrant. Using a Veress needle, access was gained to the intraabdominal cavity.  Pneumoperitoneum was established with CO2 gas to pressure of 15 mm Hg.  5mm optiview trocar was placed in the left upper quadrant. Another 5mm port was inserted in the suprapubic region. One 12 mm port was placed under direct vision at the umbilicus.  The patient was placed in Trendelenburg position with a tilt towards the left.  The small bowel and sigmoid colon were swept to the patient's left  exposing the cecum. The cecum was identified and an inflamed appendix was also identified.  The terminal ileum was identified and protected. The appendix was grasped at mid body and retracted away from the cecum with a laparoscopic grasper.  The mesoappendix was divided using Ligasure energy device. The appendix was then divided using endoscopic JAKE linear cutting stapler purple tissue load.  No bleeding was observed from the staple line.  Hemostasis was achieved.  The appendix was then removed from the abdomen using a laparoscopic specimen bag. The 12 mm port fascia was closed with 0-vicryl under direct visualization using a laparoscopic suture passer. The trocar sites were infiltrated with 0.25% marcaine in the TAP plane and the skin. The abdomen was then desulflated and cannulas removed under direct visualization.  The wounds were irrigated and the skin incisions closed with interrupted 4-0 Moncryl subcuticular sutures.  Dermabond was applied.  All instrument, sponge and needle counts were correct.  I was present and scrubbed for the entire operation. The patient tolerated the procedure well and was sent to PACU in stable condition.      Kaitlin Lee MD  [facility] - General Surgery  [address] [place] [id number]  p [phone]

## 2025-02-18 NOTE — ANESTHESIA PREPROCEDURE EVALUATION
Anesthesia PreOp Note    HPI:     Kristin Hebert is a 74 year old female who presents for preoperative consultation requested by: Kaitlin Lee MD    Date of Surgery: 2/17/2025    Procedure(s):  LAPAROSCOPIC APPENDECTOMY  Indication: Acute appendicitis    Relevant Problems   No relevant active problems       NPO:  Last Liquid Consumption Date: 02/17/25  Last Liquid Consumption Time: 0900 (milkshake)  Last Solid Consumption Date: 02/16/25  Last Solid Consumption Time: 2000  Last Liquid Consumption Date: 02/17/25          History Review:  Patient Active Problem List    Diagnosis Date Noted    Acute appendicitis, unspecified acute appendicitis type 02/17/2025    Acute appendicitis 02/17/2025       Past Medical History:    Depression    Diabetes (HCC)    Essential hypertension    High cholesterol    Osteoarthritis       Past Surgical History:   Procedure Laterality Date    Total knee replacement Left 2020       Prescriptions Prior to Admission[1]  Current Medications and Prescriptions Ordered in Epic[2]    Allergies[3]    Family History   Problem Relation Age of Onset    Cancer Father         skin cancer    Diabetes Mother     Hypertension Sister     Diabetes Sister     Diabetes Brother      Social History     Socioeconomic History    Marital status:    Tobacco Use    Smoking status: Never    Smokeless tobacco: Never   Substance and Sexual Activity    Drug use: Not Currently       Available pre-op labs reviewed.  Lab Results   Component Value Date    WBC 5.9 02/17/2025    RBC 4.23 02/17/2025    HGB 12.7 02/17/2025    HCT 38.1 02/17/2025    MCV 90.1 02/17/2025    MCH 30.0 02/17/2025    MCHC 33.3 02/17/2025    RDW 13.1 02/17/2025    .0 02/17/2025     Lab Results   Component Value Date     02/17/2025    K 4.1 02/17/2025     02/17/2025    CO2 29.0 02/17/2025    BUN 15 02/17/2025    CREATSERUM 0.62 02/17/2025     (H) 02/17/2025    PGLU 95 02/17/2025    CA 9.5 02/17/2025          Vital  Signs:  Body mass index is 25.24 kg/m².   height is 1.575 m (5' 2\") and weight is 62.6 kg (138 lb). Her oral temperature is 98.2 °F (36.8 °C). Her blood pressure is 118/59 and her pulse is 72. Her respiration is 18 and oxygen saturation is 96%.   Vitals:    02/17/25 1406 02/17/25 1434 02/17/25 1447 02/17/25 1836   BP: 138/72 129/76  118/59   Pulse: 65 63  72   Resp: 16 18     Temp:  98.2 °F (36.8 °C)     TempSrc:  Oral     SpO2: 99% 98%  96%   Weight:   62.6 kg (138 lb) 62.6 kg (138 lb)   Height:    1.575 m (5' 2\")        Anesthesia Evaluation     Patient summary reviewed and Nursing notes reviewed    Airway   Mallampati: II  TM distance: >3 FB  Neck ROM: full  Dental    (+) partials    Pulmonary - negative ROS and normal exam    breath sounds clear to auscultation  Cardiovascular - normal exam  (+) hypertension    Rhythm: regular  Rate: normal    Neuro/Psych    (+)   depression      GI/Hepatic/Renal      Endo/Other    (+) diabetes mellitus  Abdominal                  Anesthesia Plan:   ASA:  3  Plan:   General  Airway:  ETT  Post-op Pain Management: IV analgesics  Informed Consent Plan and Risks Discussed With:  Patient      I have informed Kristin Hebert and/or legal guardian or family member of the nature of the anesthetic plan, benefits, risks including possible dental damage if relevant, major complications, and any alternative forms of anesthetic management.   All of the patient's questions were answered to the best of my ability. The patient desires the anesthetic management as planned.  CHARLES GARDNER MD  2/17/2025 6:59 PM  Present on Admission:  **None**           [1]   Medications Prior to Admission   Medication Sig Dispense Refill Last Dose/Taking    metFORMIN HCl 1000 MG Oral Tab Take 1 tablet (1,000 mg total) by mouth 2 (two) times daily with meals.   2/16/2025 at  9:00 PM    ibuprofen 200 MG Oral Tab Take 2 tablets (400 mg total) by mouth every 6 (six) hours as needed for Pain.   2/17/2025     escitalopram 10 MG Oral Tab Take 1 tablet (10 mg total) by mouth daily.   2/16/2025 at 11:00 AM    rosuvastatin 5 MG Oral Tab Take 1 tablet (5 mg total) by mouth nightly.   2/16/2025 at  9:00 PM    amLODIPine 5 MG Oral Tab Take 1 tablet (5 mg total) by mouth daily.   2/16/2025 at  4:00 PM    hydroCHLOROthiazide 12.5 MG Oral Tab Take 1 tablet (12.5 mg total) by mouth every other day.   2/16/2025 at 12:00 PM   [2]   Current Facility-Administered Medications Ordered in Epic   Medication Dose Route Frequency Provider Last Rate Last Admin    sodium chloride 0.9% infusion   Intravenous Continuous Иван Arreguin  mL/hr at 02/17/25 1430 New Bag at 02/17/25 1430    [Transfer Hold] acetaminophen (Tylenol Extra Strength) tab 500 mg  500 mg Oral Q4H PRN Иван Arreguin MD        [Transfer Hold] ondansetron (Zofran) 4 MG/2ML injection 4 mg  4 mg Intravenous Q6H PRN Иван Arreguin MD        [Transfer Hold] metoclopramide (Reglan) 5 mg/mL injection 10 mg  10 mg Intravenous Q8H PRN Иван Arreguin MD        [Transfer Hold] temazepam (Restoril) cap 15 mg  15 mg Oral Nightly PRN Иван Arreguin MD        [Transfer Hold] morphINE PF 2 MG/ML injection 1 mg  1 mg Intravenous Q2H PRN Иван Arreguin MD        Or    [Transfer Hold] morphINE PF 2 MG/ML injection 2 mg  2 mg Intravenous Q2H PRN Иавн Arreguin MD        Or    [Transfer Hold] morphINE PF 4 MG/ML injection 4 mg  4 mg Intravenous Q2H PRN Иван Arreguin MD        [START ON 2/18/2025] cefTRIAXone (Rocephin) 2 g in sodium chloride 0.9% 100 mL IVPB-ADDV  2 g Intravenous Q24H Иван Arreguin MD        metroNIDAZOLE in sodium chloride 0.79% (Flagyl) 5 mg/mL IVPB premix 500 mg  500 mg Intravenous Q12H Иван Arreguin  mL/hr at 02/17/25 1617 500 mg at 02/17/25 1617    [Transfer Hold] glucose (Dex4) 15 GM/59ML oral liquid 15 g  15 g Oral Q15 Min PRN Иван Arreguin MD        Or    [Transfer Hold] glucose (Glutose) 40% oral gel 15 g  15 g Oral Q15 Min PRN  Иван Arreguin MD        Or    [Transfer Hold] glucose-vitamin C (Dex-4) chewable tab 4 tablet  4 tablet Oral Q15 Min PRN Иван Arreguin MD        Or    [Transfer Hold] dextrose 50% injection 50 mL  50 mL Intravenous Q15 Min PRN Иван Arreguin MD        Or    [Transfer Hold] glucose (Dex4) 15 GM/59ML oral liquid 30 g  30 g Oral Q15 Min PRN Иван Arreguin MD        Or    [Transfer Hold] glucose (Glutose) 40% oral gel 30 g  30 g Oral Q15 Min PRN Иван Arreguin MD        Or    [Transfer Hold] glucose-vitamin C (Dex-4) chewable tab 8 tablet  8 tablet Oral Q15 Min PRN Иван Arreguin MD        [Transfer Hold] insulin aspart (NovoLOG) 100 Units/mL FlexPen 1-5 Units  1-5 Units Subcutaneous TID CC Иван Arreguin MD        [Transfer Hold] amLODIPine (Norvasc) tab 5 mg  5 mg Oral Daily Иван Arreguin MD   5 mg at 02/17/25 1617    [Transfer Hold] escitalopram (Lexapro) tab 10 mg  10 mg Oral Daily Иван Arreguin MD   10 mg at 02/17/25 1617    [Transfer Hold] rosuvastatin (Crestor) tab 5 mg  5 mg Oral Nightly Иван Arreguin MD         No current [facility]-ordered outpatient medications on file.   [3] No Known Allergies

## 2025-02-18 NOTE — ANESTHESIA PROCEDURE NOTES
Airway  Date/Time: 2/17/2025 7:25 PM  Urgency: Elective      General Information and Staff    Patient location during procedure: OR  Anesthesiologist: Saida Treviño MD  Performed: anesthesiologist   Performed by: Saida Treviño MD  Authorized by: Saida Treviño MD      Indications and Patient Condition  Indications for airway management: anesthesia  Sedation level: deep  Preoxygenated: yes  Patient position: sniffing  Mask difficulty assessment: 1 - vent by mask    Final Airway Details  Final airway type: endotracheal airway      Successful airway: ETT  Cuffed: yes   Successful intubation technique: direct laryngoscopy  Blade: GlideScope    Placement verified by: capnometry   Measured from: lips  ETT to lips (cm): 22

## 2025-02-18 NOTE — PLAN OF CARE
Problem: Patient Centered Care  Goal: Patient preferences are identified and integrated in the patient's plan of care  Description: Interventions:  - What would you like us to know as we care for you? Home with family  - Provide timely, complete, and accurate information to patient/family  - Incorporate patient and family knowledge, values, beliefs, and cultural backgrounds into the planning and delivery of care  - Encourage patient/family to participate in care and decision-making at the level they choose  - Honor patient and family perspectives and choices  Outcome: Adequate for Discharge     Problem: Patient/Family Goals  Goal: Patient/Family Long Term Goal  Description: Patient's Long Term Goal: home    Interventions:  -   - See additional Care Plan goals for specific interventions  Outcome: Adequate for Discharge  Goal: Patient/Family Short Term Goal  Description: Patient's Short Term Goal: home    Interventions:   -   - See additional Care Plan goals for specific interventions  Outcome: Adequate for Discharge     Problem: PAIN - ADULT  Goal: Verbalizes/displays adequate comfort level or patient's stated pain goal  Description: INTERVENTIONS:  - Encourage pt to monitor pain and request assistance  - Assess pain using appropriate pain scale  - Administer analgesics based on type and severity of pain and evaluate response  - Implement non-pharmacological measures as appropriate and evaluate response  - Consider cultural and social influences on pain and pain management  - Manage/alleviate anxiety  - Utilize distraction and/or relaxation techniques  - Monitor for opioid side effects  - Notify MD/LIP if interventions unsuccessful or patient reports new pain  - Anticipate increased pain with activity and pre-medicate as appropriate  Outcome: Adequate for Discharge     Problem: GASTROINTESTINAL - ADULT  Goal: Maintains or returns to baseline bowel function  Description: INTERVENTIONS:  - Assess bowel function  -  Maintain adequate hydration with IV or PO as ordered and tolerated  - Evaluate effectiveness of GI medications  - Encourage mobilization and activity  - Obtain nutritional consult as needed  - Establish a toileting routine/schedule  - Consider collaborating with pharmacy to review patient's medication profile  Outcome: Adequate for Discharge     Problem: GENITOURINARY - ADULT  Goal: Absence of urinary retention  Description: INTERVENTIONS:  - Assess patient’s ability to void and empty bladder  - Monitor intake/output and perform bladder scan as needed  - Follow urinary retention protocol/standard of care  - Consider collaborating with pharmacy to review patient's medication profile  - Implement strategies to promote bladder emptying  Outcome: Adequate for Discharge     Problem: METABOLIC/FLUID AND ELECTROLYTES - ADULT  Goal: Glucose maintained within prescribed range  Description: INTERVENTIONS:  - Monitor Blood Glucose as ordered  - Assess for signs and symptoms of hyperglycemia and hypoglycemia  - Administer ordered medications to maintain glucose within target range  - Assess barriers to adequate nutritional intake and initiate nutrition consult as needed  - Instruct patient on self management of diabetes  Outcome: Adequate for Discharge  Goal: Electrolytes maintained within normal limits  Description: INTERVENTIONS:  - Monitor labs and rhythm and assess patient for signs and symptoms of electrolyte imbalances  - Administer electrolyte replacement as ordered  - Monitor response to electrolyte replacements, including rhythm and repeat lab results as appropriate  - Fluid restriction as ordered  - Instruct patient on fluid and nutrition restrictions as appropriate  Outcome: Adequate for Discharge     Problem: SKIN/TISSUE INTEGRITY - ADULT  Goal: Incision(s), wounds(s) or drain site(s) healing without S/S of infection  Description: INTERVENTIONS:  - Assess and document risk factors for pressure ulcer development  -  Assess and document skin integrity  - Assess and document dressing/incision, wound bed, drain sites and surrounding tissue  - Implement wound care per orders  - Initiate isolation precautions as appropriate  - Initiate Pressure Ulcer prevention bundle as indicated  Outcome: Adequate for Discharge     Problem: SAFETY ADULT - FALL  Goal: Free from fall injury  Description: INTERVENTIONS:  - Assess pt frequently for physical needs  - Identify cognitive and physical deficits and behaviors that affect risk of falls.  - [place] fall precautions as indicated by assessment.  - Educate pt/family on patient safety including physical limitations  - Instruct pt to call for assistance with activity based on assessment  - Modify environment to reduce risk of injury  - Provide assistive devices as appropriate  - Consider OT/PT consult to assist with strengthening/mobility  - Encourage toileting schedule  Outcome: Adequate for Discharge     Problem: DISCHARGE PLANNING  Goal: Discharge to home or other facility with appropriate resources  Description: INTERVENTIONS:  - Identify barriers to discharge w/pt and caregiver  - Include patient/family/discharge partner in discharge planning  - Arrange for needed discharge resources and transportation as appropriate  - Identify discharge learning needs (meds, wound care, etc)  - Arrange for interpreters to assist at discharge as needed  - Consider post-discharge preferences of patient/family/discharge partner  - Complete POLST form as appropriate  - Assess patient's ability to be responsible for managing their own health  - Refer to Case Management Department for coordinating discharge planning if the patient needs post-hospital services based on physician/LIP order or complex needs related to functional status, cognitive ability or social support system  Outcome: Adequate for Discharge     Problem: Altered Communication/Language Barrier  Goal: Patient/Family is able to understand and  participate in their care  Description: Interventions:  - Assess communication ability and preferred communication style  - Implement communication aides and strategies  - Use visual cues when possible  - Listen attentively, be patient, do not interrupt  - Minimize distractions  - Allow time for understanding and response  - Establish method for patient to ask for assistance (call light)  - Provide an  as needed  - Communicate barriers and strategies to overcome with those who interact with patient  Outcome: Adequate for Discharge   Pain tolerable. Up and about in hallway. Tolerated low fiber diet well. Discharge order written. Saline lock discontinued. Discharge instructions given and discussed with patient and family, instructions includes medications to continue taking at home, when to take the next dose and medication that was prescribed to her pharmacy. Post -op instructions discussed and follow up appointment with Dr. Lee in 2 weeks, verbalized understanding of all instructions. Discharged home ambulatory with family. Patient in stable condition upon discharge.

## 2025-02-18 NOTE — PROGRESS NOTES
[facility]  Progress Note    Kristin Hebert Patient Status:  Inpatient    1950 MRN [id number]   Location [facility] 1W Attending Dacia Heath MD   Hosp Day # 1 PCP Cody Llamas     Subjective:  Patient is seen lying in bed with family member in the room. Patient states she has some soreness near the incision sites. Patient denies nausea and vomiting. Patient reports she tolerated fulls diet for breakfast.     Objective/Physical Exam:  General: Alert, orientated x3.  Cooperative.  No apparent distress.  Vital Signs:  Blood pressure 113/67, pulse 76, temperature 97.6 °F (36.4 °C), temperature source Oral, resp. rate 18, height 5' 2\" (1.575 m), weight 138 lb, SpO2 97%.  Wt Readings from Last 3 Encounters:   25 138 lb   24 152 lb     Lungs: No respiratory distress.  Cardiac: Regular rate and rhythm.   Abdomen:  Soft, mildly distended, mildly tender, with no rebound or guarding.  No peritoneal signs.   Extremities:  No lower extremity edema noted.    Incision: Clean, dry, intact, no erythema      Intake/Output:    Intake/Output Summary (Last 24 hours) at 2025 0851  Last data filed at 2025 2100  Gross per 24 hour   Intake 1550 ml   Output 300 ml   Net 1250 ml     I/O last 3 completed shifts:  In: 1550 [I.V.:1550]  Out: 300 [Urine:300]  No intake/output data recorded.    Medications:    insulin aspart  1-5 Units Subcutaneous TID CC    amLODIPine  5 mg Oral Daily    escitalopram  10 mg Oral Daily    rosuvastatin  5 mg Oral Nightly    acetaminophen  750 mg Oral Q8H       Labs:  Lab Results   Component Value Date    WBC 7.0 2025    HGB 12.5 2025    HCT 36.2 2025    .0 2025     Lab Results   Component Value Date     2025    K 4.0 2025     2025    CO2 24.0 2025    BUN 9 2025    CREATSERUM 0.55 2025     2025    CA 9.1 2025    ALKPHO 63 2025    ALT 10 2025    AST 15  02/17/2025    BILT 0.6 02/17/2025    ALB 4.4 02/17/2025    TP 7.1 02/17/2025     No results found for: \"PT\", \"INR\"      Assessment  Patient Active Problem List   Diagnosis    Acute appendicitis, unspecified acute appendicitis type    Acute appendicitis     S/p laparoscopic appendectomy on 02/17/2025    Plan:  Patient is stable from a surgical standpoint once tolerating soft diet.   Follow up with general surgery PA in 2 weeks, or sooner if needed.   Antiemetics and analgesics as needed  Ambulate and up to chair  DVT prophylaxis with SCDs      Quality:  DVT Mechanical Prophylaxis:   SCDs,    DVT Pharmacologic Prophylaxis   Medication   None                Code Status: Not on file  Field: No urinary catheter in place  Field Duration (in days):   Central line:    CRISTIN:         Nevaeh Eisenberg  2/18/2025  8:51 AM      The patient was discussed with Shelley Cruz PA-C.

## 2025-02-18 NOTE — PAYOR COMM NOTE
--------------  OBSERVATION STATUS    DISCHARGE REVIEW    Payor: [facility]  Subscriber #:  [id number]  Authorization Number: [id number]    Admit date: 25  Admit time:   2:22 PM  Discharge Date: planned for      Admitting Physician: Иван Arreguin MD  Attending Physician:  Dacia Heath MD  Primary Care Physician: Cody Llamas          Discharge Summary Notes        Discharge Summary signed by Dacia Heath MD at 2025 11:24 AM       Author: Dacia Heath MD Specialty: HOSPITALIST, Internal Medicine Author Type: Physician    Filed: 2025 11:24 AM Date of Service: 2025 11:22 AM Status: Signed    : Dacia Heath MD (Physician)           [facility]  part of [facility]    Discharge Summary    Kristin Hebert Patient Status:  Inpatient    1950 MRN [id number]   Location [facility] 1W Attending Dacia Heath MD   Hosp Day # 1 PCP Cody Llamas     Date of Admission: 2025      Date of Discharge: 25      Admitting Diagnosis: Acute appendicitis, unspecified acute appendicitis type [K35.80]    Hospital Discharge Diagnoses:  Acute appy    Lace+ Score: 37  59-90 High Risk  29-58 Medium Risk  0-28   Low Risk.    TCM Follow-Up Recommendation:  LACE 29-58: Moderate Risk of readmission after discharge from the hospital.          Problem List:   Patient Active Problem List   Diagnosis    Acute appendicitis, unspecified acute appendicitis type    Acute appendicitis         Physical Exam:     Gen: No acute distress  Pulm: Lungs clear, normal respiratory effort  CV: Heart with regular rate and rhythm  Abd: Abdomen soft, nontender, nondistended, bowel sounds present  Neuro: No acute focal deficits  MSK: moves extremities  Skin: Warm and dry  Psych: Normal affect  Ext: no c/c/e      History of Present Illness: Per Dr Arreguin    Patient is a 74-year-old  female who came into the emergency department for  evaluation of 3 days of right lower quadrant abdominal pain. CBC, chemistry, urinalysis, and liver function tests were unremarkable. CT scan of the abdomen and pelvis showed acute uncomplicated appendicitis. Started on IV antibiotics, and she will be admitted to the hospital for further management     Hospital Course: Patient taken for lap appy.  Tolerated procedure.  Post op pain controlled with oral meds. Tolerating diet.  Stable for home and outpt follow up    Discharge Condition: Stable    Discharge Medications:      Discharge Medications        START taking these medications        Instructions Prescription details   acetaminophen 500 MG Tabs  Commonly known as: Tylenol Extra Strength      Take 1.5 tablets (750 mg total) by mouth every 4 (four) hours as needed for Pain.   Quantity: 40 tablet  Refills: 0     Ibuprofen 200 MG Caps  Commonly known as: Advil  Replaces: ibuprofen 200 MG Tabs      Take 2 capsules (400 mg total) by mouth every 6 (six) hours as needed.   Stop taking on: March 19, 2025  Quantity: 40 capsule  Refills: 0     oxyCODONE 5 MG Tabs      Take 1 tablet (5 mg total) by mouth every 4 (four) hours as needed for Pain.   Quantity: 5 tablet  Refills: 0            CONTINUE taking these medications        Instructions Prescription details   amLODIPine 5 MG Tabs  Commonly known as: Norvasc      Take 1 tablet (5 mg total) by mouth daily.   Refills: 0     escitalopram 10 MG Tabs  Commonly known as: Lexapro      Take 1 tablet (10 mg total) by mouth daily.   Refills: 0     hydroCHLOROthiazide 12.5 MG Tabs      Take 1 tablet (12.5 mg total) by mouth every other day.   Refills: 0     metFORMIN HCl 1000 MG Tabs  Commonly known as: GLUCOPHAGE      Take 1 tablet (1,000 mg total) by mouth 2 (two) times daily with meals.   Refills: 0     rosuvastatin 5 MG Tabs  Commonly known as: Crestor      Take 1 tablet (5 mg total) by mouth nightly.   Refills: 0            STOP taking these medications      ibuprofen 200 MG  Tabs  Commonly known as: Motrin  Replaced by: Ibuprofen 200 MG Caps                  Where to Get Your Medications        These medications were sent to [id number] - [place], [place] - [address] [facility] [address], [phone], [phone] [address], [place] [address]      Phone: [phone]   acetaminophen 500 MG Tabs  Ibuprofen 200 MG Caps  oxyCODONE 5 MG Tabs             Dacia Heath MD  2/18/2025  11:23 AM    Greater than 30 minutes spent on preparation and coordination of this discharge      Electronically signed by Dacia Heath MD on 2/18/2025 11:24 AM         REVIEWER COMMENTS

## 2025-02-18 NOTE — CM/SW NOTE
This is a Code 44. Patient failed inpatient criteria. Second level of review completed and supports observation.  UR committee in agreement. BERTHA WORTHY Discussed with Dr. Solares  who approves observation status.  Observation order placed. SULLIVAN given to the patient and signed copy placed in their chart.  Shilpi BARBOSA RN, 02/18/25, 3:09 PM

## 2025-02-18 NOTE — PLAN OF CARE
Problem: GENITOURINARY - ADULT  Goal: Absence of urinary retention  Description: INTERVENTIONS:  - Assess patient’s ability to void and empty bladder  - Monitor intake/output and perform bladder scan as needed  - Follow urinary retention protocol/standard of care  - Consider collaborating with pharmacy to review patient's medication profile  - Implement strategies to promote bladder emptying  Outcome: Progressing     Problem: METABOLIC/FLUID AND ELECTROLYTES - ADULT  Goal: Glucose maintained within prescribed range  Description: INTERVENTIONS:  - Monitor Blood Glucose as ordered  - Assess for signs and symptoms of hyperglycemia and hypoglycemia  - Administer ordered medications to maintain glucose within target range  - Assess barriers to adequate nutritional intake and initiate nutrition consult as needed  - Instruct patient on self management of diabetes  Outcome: Progressing  Goal: Electrolytes maintained within normal limits  Description: INTERVENTIONS:  - Monitor labs and rhythm and assess patient for signs and symptoms of electrolyte imbalances  - Administer electrolyte replacement as ordered  - Monitor response to electrolyte replacements, including rhythm and repeat lab results as appropriate  - Fluid restriction as ordered  - Instruct patient on fluid and nutrition restrictions as appropriate  Outcome: Progressing     Problem: SKIN/TISSUE INTEGRITY - ADULT  Goal: Incision(s), wounds(s) or drain site(s) healing without S/S of infection  Description: INTERVENTIONS:  - Assess and document risk factors for pressure ulcer development  - Assess and document skin integrity  - Assess and document dressing/incision, wound bed, drain sites and surrounding tissue  - Implement wound care per orders  - Initiate isolation precautions as appropriate  - Initiate Pressure Ulcer prevention bundle as indicated  Outcome: Progressing     Problem: SAFETY ADULT - FALL  Goal: Free from fall injury  Description: INTERVENTIONS:  -  Assess pt frequently for physical needs  - Identify cognitive and physical deficits and behaviors that affect risk of falls.  - [place] fall precautions as indicated by assessment.  - Educate pt/family on patient safety including physical limitations  - Instruct pt to call for assistance with activity based on assessment  - Modify environment to reduce risk of injury  - Provide assistive devices as appropriate  - Consider OT/PT consult to assist with strengthening/mobility  - Encourage toileting schedule  Outcome: Progressing     Problem: DISCHARGE PLANNING  Goal: Discharge to home or other facility with appropriate resources  Description: INTERVENTIONS:  - Identify barriers to discharge w/pt and caregiver  - Include patient/family/discharge partner in discharge planning  - Arrange for needed discharge resources and transportation as appropriate  - Identify discharge learning needs (meds, wound care, etc)  - Arrange for interpreters to assist at discharge as needed  - Consider post-discharge preferences of patient/family/discharge partner  - Complete POLST form as appropriate  - Assess patient's ability to be responsible for managing their own health  - Refer to Case Management Department for coordinating discharge planning if the patient needs post-hospital services based on physician/LIP order or complex needs related to functional status, cognitive ability or social support system  Outcome: Progressing     Problem: Altered Communication/Language Barrier  Goal: Patient/Family is able to understand and participate in their care  Description: Interventions:  - Assess communication ability and preferred communication style  - Implement communication aides and strategies  - Use visual cues when possible  - Listen attentively, be patient, do not interrupt  - Minimize distractions  - Allow time for understanding and response  - Establish method for patient to ask for assistance (call light)  - Provide an   as needed  - Communicate barriers and strategies to overcome with those who interact with patient  Outcome: Progressing

## 2025-02-18 NOTE — ANESTHESIA POSTPROCEDURE EVALUATION
Patient: Kristin Hebert    Procedure Summary       Date: 02/17/25 Room / Location: [facility] MAIN OR 08 / [facility] MAIN OR    Anesthesia Start: 1921 Anesthesia Stop: 2007    Procedure: LAPAROSCOPIC APPENDECTOMY (Abdomen) Diagnosis: (Acute appendicitis)    Surgeons: Kaitlin Lee MD Anesthesiologist: Saida Treviño MD    Anesthesia Type: general ASA Status: 3            Anesthesia Type: general    Vitals Value Taken Time   /81 02/17/25 2007   Temp 97.6 °F (36.4 °C) 02/17/25 2007   Pulse 92 02/17/25 2007   Resp 12 02/17/25 2006   SpO2 98 % 02/17/25 2006   Vitals shown include unfiled device data.    [facility] AN Post Evaluation:   Patient Evaluated in PACU  Patient Participation: complete - patient participated  Level of Consciousness: awake and alert  Pain Management: adequate  Airway Patency:patent  Dental exam unchanged from preop  Yes    Nausea/Vomiting: none  Cardiovascular Status: acceptable  Respiratory Status: acceptable  Postoperative Hydration acceptable      CHARLES GARDNER MD  2/17/2025 8:07 PM

## 2025-02-18 NOTE — DISCHARGE SUMMARY
[facility]  part of [facility]    Discharge Summary    Kristin Hebert Patient Status:  Inpatient    1950 MRN [id number]   Location [facility] 1W Attending Dacia Heath MD   Hosp Day # 1 PCP Cody Llamas     Date of Admission: 2025      Date of Discharge: 25      Admitting Diagnosis: Acute appendicitis, unspecified acute appendicitis type [K35.80]    Hospital Discharge Diagnoses:  Acute appy    Lace+ Score: 37  59-90 High Risk  29-58 Medium Risk  0-28   Low Risk.    TCM Follow-Up Recommendation:  LACE 29-58: Moderate Risk of readmission after discharge from the hospital.          Problem List:   Patient Active Problem List   Diagnosis    Acute appendicitis, unspecified acute appendicitis type    Acute appendicitis         Physical Exam:     Gen: No acute distress  Pulm: Lungs clear, normal respiratory effort  CV: Heart with regular rate and rhythm  Abd: Abdomen soft, nontender, nondistended, bowel sounds present  Neuro: No acute focal deficits  MSK: moves extremities  Skin: Warm and dry  Psych: Normal affect  Ext: no c/c/e      History of Present Illness: Per Dr Arreguin    Patient is a 74-year-old  female who came into the emergency department for evaluation of 3 days of right lower quadrant abdominal pain. CBC, chemistry, urinalysis, and liver function tests were unremarkable. CT scan of the abdomen and pelvis showed acute uncomplicated appendicitis. Started on IV antibiotics, and she will be admitted to the hospital for further management     Hospital Course: Patient taken for lap appy.  Tolerated procedure.  Post op pain controlled with oral meds. Tolerating diet.  Stable for home and outpt follow up    Discharge Condition: Stable    Discharge Medications:      Discharge Medications        START taking these medications        Instructions Prescription details   acetaminophen 500 MG Tabs  Commonly known as: Tylenol Extra Strength      Take 1.5  tablets (750 mg total) by mouth every 4 (four) hours as needed for Pain.   Quantity: 40 tablet  Refills: 0     Ibuprofen 200 MG Caps  Commonly known as: Advil  Replaces: ibuprofen 200 MG Tabs      Take 2 capsules (400 mg total) by mouth every 6 (six) hours as needed.   Stop taking on: March 19, 2025  Quantity: 40 capsule  Refills: 0     oxyCODONE 5 MG Tabs      Take 1 tablet (5 mg total) by mouth every 4 (four) hours as needed for Pain.   Quantity: 5 tablet  Refills: 0            CONTINUE taking these medications        Instructions Prescription details   amLODIPine 5 MG Tabs  Commonly known as: Norvasc      Take 1 tablet (5 mg total) by mouth daily.   Refills: 0     escitalopram 10 MG Tabs  Commonly known as: Lexapro      Take 1 tablet (10 mg total) by mouth daily.   Refills: 0     hydroCHLOROthiazide 12.5 MG Tabs      Take 1 tablet (12.5 mg total) by mouth every other day.   Refills: 0     metFORMIN HCl 1000 MG Tabs  Commonly known as: GLUCOPHAGE      Take 1 tablet (1,000 mg total) by mouth 2 (two) times daily with meals.   Refills: 0     rosuvastatin 5 MG Tabs  Commonly known as: Crestor      Take 1 tablet (5 mg total) by mouth nightly.   Refills: 0            STOP taking these medications      ibuprofen 200 MG Tabs  Commonly known as: Motrin  Replaced by: Ibuprofen 200 MG Caps                  Where to Get Your Medications        These medications were [facility] to [id number] - [place], [place] - [address] AT [facility] [address], [phone], [phone] [address] [place] [address] [place] [address]      Phone: [phone]   acetaminophen 500 MG Tabs  Ibuprofen 200 MG Caps  oxyCODONE 5 MG Tabs             Dacia Heath MD  2/18/2025  11:23 AM    Greater than 30 minutes spent on preparation and coordination of this discharge

## 2025-02-25 ENCOUNTER — TELEPHONE (OUTPATIENT)
Dept: SURGERY | Facility: CLINIC | Age: 75
End: 2025-02-25

## 2025-03-05 ENCOUNTER — NURSE ONLY (OUTPATIENT)
Dept: SURGERY | Facility: CLINIC | Age: 75
End: 2025-03-05
Payer: MEDICAID

## 2025-03-05 VITALS — SYSTOLIC BLOOD PRESSURE: 126 MMHG | HEART RATE: 76 BPM | DIASTOLIC BLOOD PRESSURE: 71 MMHG

## 2025-03-05 DIAGNOSIS — Z48.89 ENCOUNTER FOR POSTOPERATIVE CARE: Primary | ICD-10-CM

## 2025-03-05 PROBLEM — K35.80 ACUTE APPENDICITIS, UNSPECIFIED ACUTE APPENDICITIS TYPE: Status: RESOLVED | Noted: 2025-02-17 | Resolved: 2025-03-05

## 2025-03-05 PROBLEM — K35.80 ACUTE APPENDICITIS: Status: RESOLVED | Noted: 2025-02-17 | Resolved: 2025-03-05

## 2025-03-05 PROCEDURE — 99024 POSTOP FOLLOW-UP VISIT: CPT

## 2025-03-05 NOTE — PROGRESS NOTES
Follow Up Visit Note       Active Problems      1. Encounter for postoperative care          Chief Complaint   Chief Complaint   Patient presents with    Post-Op     PO lap appy with Dr. Lee          History of Present Illness  Patient is Lao speaking, son present and provides translation. Kristin is a pleasant 74 year old year old patient presenting for post op appointment. She generally feels well, she denies abdominal pain. She is tolerating a general diet without diarrhea or constipation. She denies fever, chills, SOB, chest pain, leg swelling or calf tenderness. Patient reports she had a colonoscopy over 10 years ago.       Allergies  Kristin has No Known Allergies.    Past Medical / Surgical / Social / Family History    The past medical and past surgical history have been reviewed by me today.    Past Medical History:    Acute appendicitis    Acute appendicitis, unspecified acute appendicitis type    Depression    Diabetes (HCC)    Essential hypertension    High cholesterol    Osteoarthritis     Past Surgical History:   Procedure Laterality Date    Laparoscopic appendectomy  02/2025    Total knee replacement Left 2020       The family history and social history have been reviewed by me today.    Family History   Problem Relation Age of Onset    Cancer Father         skin cancer    Diabetes Mother     Hypertension Sister     Diabetes Sister     Diabetes Brother      Social History     Socioeconomic History    Marital status:    Tobacco Use    Smoking status: Never    Smokeless tobacco: Never   Substance and Sexual Activity    Drug use: Not Currently        Current Outpatient Medications:     metFORMIN HCl 1000 MG Oral Tab, Take 1 tablet (1,000 mg total) by mouth 2 (two) times daily with meals., Disp: , Rfl:     Ibuprofen (ADVIL) 200 MG Oral Cap, Take 2 capsules (400 mg total) by mouth every 6 (six) hours as needed., Disp: 40 capsule, Rfl: 0    acetaminophen 500 MG Oral Tab, Take 1.5 tablets (750 mg  total) by mouth every 4 (four) hours as needed for Pain., Disp: 40 tablet, Rfl: 0    escitalopram 10 MG Oral Tab, Take 1 tablet (10 mg total) by mouth daily., Disp: , Rfl:     rosuvastatin 5 MG Oral Tab, Take 1 tablet (5 mg total) by mouth nightly., Disp: , Rfl:     amLODIPine 5 MG Oral Tab, Take 1 tablet (5 mg total) by mouth daily., Disp: , Rfl:     hydroCHLOROthiazide 12.5 MG Oral Tab, Take 1 tablet (12.5 mg total) by mouth every other day., Disp: , Rfl:      Review of Systems  The Review of Systems has been reviewed by me during today.  Review of Systems   Constitutional:  Negative for chills, fatigue and fever.   Respiratory:  Negative for shortness of breath.    Cardiovascular:  Negative for chest pain and leg swelling.   Gastrointestinal:  Negative for abdominal pain, constipation, diarrhea, nausea and vomiting.        Physical Findings   /71 (BP Location: Right arm, Patient Position: Sitting)   Pulse 76   Physical Exam  Constitutional:       General: She is not in acute distress.     Appearance: She is not ill-appearing.   HENT:      Head: Normocephalic and atraumatic.   Eyes:      Extraocular Movements: Extraocular movements intact.      Conjunctiva/sclera: Conjunctivae normal.      Pupils: Pupils are equal, round, and reactive to light.   Abdominal:      General: Abdomen is flat. There is no distension.      Palpations: Abdomen is soft.      Tenderness: There is no abdominal tenderness.   Skin:     General: Skin is warm.      Comments: incision C/D/I.   Neurological:      Mental Status: She is alert.          Assessment   1. Encounter for postoperative care      Pathology reviewed with the patient    Plan   No lifting restrictions  Continue good hygiene of incision site  OK to swim or take a bath  Discussed clinical utility of colonoscopy for patient given age. Recommend colonoscopy, to follow up with PCP for ongoing discussion/referral.   Follow up as needed       No orders of the defined types  were placed in this encounter.      Imaging & Referrals   None    Follow Up  No follow-ups on file.    JONATAN Reynolds

## (undated) DEVICE — ENDOPATH PNEUMONEEDLE INSUFFLATION NEEDLES WITH LUER LOCK CONNECTORS 120MM: Brand: ENDOPATH

## (undated) DEVICE — TROCAR: Brand: KII® SLEEVE

## (undated) DEVICE — LAP CHOLE: Brand: MEDLINE INDUSTRIES, INC.

## (undated) DEVICE — TISSUE RETRIEVAL SYSTEM: Brand: INZII RETRIEVAL SYSTEM

## (undated) DEVICE — SUT COAT VCRL + 0 54IN ABSRB UD ANTIBACT

## (undated) DEVICE — ARTICULATION RELOAD WITH TRI-STAPLE TECHNOLOGY: Brand: ENDO GIA

## (undated) DEVICE — TROCAR: Brand: KII FIOS FIRST ENTRY

## (undated) DEVICE — SOLUTION IV 1000ML 0.9% NACL PRESERVATIVE

## (undated) DEVICE — MARYLAND JAW LAPAROSCOPIC SEALER/DIVIDER COATED: Brand: LIGASURE

## (undated) DEVICE — SOLUTION IRRIG 1000ML 0.9% NACL USP BTL

## (undated) DEVICE — Device: Brand: SUTURE PASSOR PRO

## (undated) DEVICE — GLOVE SUR 6 SENSICARE PI MIC PIP CRM PWD F

## (undated) DEVICE — GLOVE SUR 7 SENSICARE PI MIC PIP CRM PWD F

## (undated) DEVICE — Device: Brand: JELCO

## (undated) DEVICE — POWER SHELL: Brand: SIGNIA

## (undated) DEVICE — [HIGH FLOW INSUFFLATOR,  DO NOT USE IF PACKAGE IS DAMAGED,  KEEP DRY,  KEEP AWAY FROM SUNLIGHT,  PROTECT FROM HEAT AND RADIOACTIVE SOURCES.]: Brand: PNEUMOSURE

## (undated) DEVICE — SUT MCRYL 4-0 18IN PS-2 ABSRB UD 19MM 3/8 CIR

## (undated) DEVICE — GLOVE SUR 6.5 SENSICARE PI PIP CRM PWD F

## (undated) NOTE — LETTER
[facility]   Administracion de Anestesia  Yo, Kristin Anup, acepto ser atendido por un anestesiólogo, quien está especialmente capacitado para monitorearme y darme medicamento para hacerme dormir o mantenerme cómodo bethany mi procedimiento.   Entiendo que mi anestesiólogo no es un empleado o agente de [facility]. Él o miri trabaja para [facility]  [place] el paciente que solicita los servicios de anestesia, estoy de acuerdo con lo siguiente:  Permitir al anestesiólogo (médico de anestesia) que me suministre el medicamento y hacer los procedimientos adicionales que solange necesarios. Algunos ejemplos son: Al iniciar o utilizar keny “IV” para suministrarme medicamentos, fluidos o mateus bethany mi procedimiento, y colocarme keny sonda de respiración, me ayudará a respirar cuando esté dormido (intubación). En el joanie de que mi corazón deje de funcionar correctamente, entiendo que mi anestesiólogo hará todo lo posible para salvar mi niurka, a menos que los documentos de No resucitar de [facility] lo indiquen de otra manera.  Informar a mi anestesista antes del procedimiento:  Si estoy embarazada.  La última vez que comí o bebí algo.  Todos los medicamentos que gino (incluyendo medicamentos recetados, suplementos herbales y pastillas que puedo comprar sin receta médica (incluyendo drogas en la ivory [medicamentos ilegales]). No informar a mi anestesiólogo acerca de estos medicamentos puede aumentar mi riesgo de complicaciones con la anestesia.  Si soy alérgico a cualquier cosa o he tenido previamente keny reacción adversa a la anestesia.  Entiendo cómo la anestesia me ayudará (beneficios).  Entiendo que con cualquier tipo de anestesia hay riesgos:  Los riesgos más comunes son: náuseas, vómitos, dolor de garganta, dolor muscular, daño a los ojos, la boca o los dientes (por la colocación de la sonda de respiración).  Los riesgos poco comunes incluyen: recordar  lo que sucedió bethany mi procedimiento, reacciones alérgicas a los medicamentos, lesiones en las vías respiratorias, el corazón, los pulmones, la visión, los nervios o músculos, y en casos sumamente raros, la muerte.  Mii médico me ha explicado otras opciones de atención disponibles para mí (alternativas).  Pacientes embarazadas (“epidural”):    Entiendo que los riesgos de recibir keny inyección epidural (medicamento que se aplica en la espalda para ayudar a controlar el dolor bethany el parto), incluyen picazón, presión arterial baja, dificultad para orinar, dolor de alyson o disminución en el ritmo del corazón del bebé. Los riesgos muy poco comunes incluyen infección, hemorragia, convulsiones, ritmo cardíaco irregular y lesión del nervio.  Anestesia regional (“columna vertebral”, “epidural” y “bloqueo de los nervios”):  Entiendo que hay complicaciones poco frecuentes ivan posibles, e incluyen dolor de alyson, sangrado, infección, convulsiones, ritmo cardíaco irregular y la lesión del nervio.  .   Puedo cambiar de opinión acerca de recibir servicios de anestesia en cualquier momento antes de que se me administre el medicamento.         Paciente (o representante) Firma/Relación con el paciente  Fecha  Hora  Patient Signature/Signature of Responsible person Date Time           Nombre del intérprete (en zuniga joanie)  Idioma/Organización  Hora  Name of  Language/Organization Time          Firma del anestesiólogo Fecha  Hora  Signature of anesthesiologist Date Time    He hablado del procedimiento y la información anterior con el paciente (o el representante del paciente) y respondí melvin preguntas. El paciente o zuniga representante ha aceptado recibir los servicios de anestesia.         Testalex Wong  Hora  Witness Date Time  He verificado que la firma es la del paciente o del representante del paciente, y que se firmó antes del procedimiento.    Nombre del paciente: Kristin escobedo Nac.: 6/8/1950                  En letra de imprenta: February 17, 2025  Expediente médico No. [id number]                         Página 1 de 2  ----------ANESTHESIA CONSENT----------

## (undated) NOTE — LETTER
[address], [place], [place]    Authorization for Surgical Operation and Procedure                               I hereby authorize Kaitlin Lee MD, my physician and his/her assistants (if applicable), which may include medical students, residents, and/or fellows, to perform the following surgical operation/ procedure and administer such anesthesia as may be determined necessary by my physician: Operation/Procedure name (s) LAPAROSCOPIC APPENDECTOMY on Kristin Hebert   2.   I recognize that during the surgical operation/procedure, unforeseen conditions may necessitate additional or different procedures than those listed above.  I, therefore, further authorize and request that the above-named surgeon, assistants, or designees perform such procedures as are, in their judgment, necessary and desirable.    3.   My surgeon/physician has discussed prior to my surgery the potential benefits, risks and side effects of this procedure; the likelihood of achieving goals; and potential problems that might occur during recuperation.  They also discussed reasonable alternatives to the procedure, including risks, benefits, and side effects related to the alternatives and risks related to not receiving this procedure.  I have had all my questions answered and I acknowledge that no guarantee has been made as to the result that may be obtained.    4.   Should the need arise during my operation/procedure, which includes change of level of care prior to discharge, I also consent to the administration of blood and/or blood products.  Further, I understand that despite careful testing and screening of blood or blood products by collecting agencies, I may still be subject to ill effects as a result of receiving a blood transfusion and/or blood products.  The following are some, but not all, of the potential risks that can occur: fever and allergic reactions, hemolytic reactions, transmission of diseases  such as Hepatitis, AIDS and Cytomegalovirus (CMV) and fluid overload.  In the event that I wish to have an autologous transfusion of my own blood, or a directed donor transfusion, I will discuss this with my physician.  Check only if Refusing Blood or Blood Products  I understand refusal of blood or blood products as deemed necessary by my physician may have serious consequences to my condition to include possible death. I hereby assume responsibility for my refusal and release the hospital, its personnel, and my physicians from any responsibility for the consequences of my refusal.    o  Refuse   5.   I authorize the use of any specimen, organs, tissues, body parts or foreign objects that may be removed from my body during the operation/procedure for diagnosis, research or teaching purposes and their subsequent disposal by hospital authorities.  I also authorize the release of specimen test results and/or written reports to my treating physician on the hospital medical staff or other referring or consulting physicians involved in my care, at the discretion of the Pathologist or my treating physician.    6.   I consent to the photographing or videotaping of the operations or procedures to be performed, including appropriate portions of my body for medical, scientific, or educational purposes, provided my identity is not revealed by the pictures or by descriptive texts accompanying them.  If the procedure has been photographed/videotaped, the surgeon will obtain the original picture, image, videotape or CD.  The hospital will not be responsible for storage, release or maintenance of the picture, image, tape or CD.    7.   I consent to the presence of a  or observers in the operating room as deemed necessary by my physician or their designees.    8.   I recognize that in the event my procedure results in extended X-Ray/fluoroscopy time, I may develop a skin reaction.    9. If I have a Do Not Attempt  Resuscitation (DNAR) order in place, that status will be suspended while in the operating room, procedural suite, and during the recovery period unless otherwise explicitly stated by me (or a person authorized to consent on my behalf). The surgeon or my attending physician will determine when the applicable recovery period ends for purposes of reinstating the DNAR order.  10. Patients having a sterilization procedure: I understand that if the procedure is successful the results will be permanent and it will therefore be impossible for me to inseminate, conceive, or bear children.  I also understand that the procedure is intended to result in sterility, although the result has not been guaranteed.   11. I acknowledge that my physician has explained sedation/analgesia administration to me including the risk and benefits I consent to the administration of sedation/analgesia as may be necessary or desirable in the judgment of my physician.    I CERTIFY THAT I HAVE READ AND FULLY UNDERSTAND THE ABOVE CONSENT TO OPERATION and/or OTHER PROCEDURE.     ____________________________________  _________________________________        ______________________________  Signature of Patient    Signature of Responsible Person                Printed Name of Responsible Person                                      ____________________________________  _____________________________                ________________________________  Signature of Witness        Date  Time         Relationship to Patient    STATEMENT OF PHYSICIAN My signature below affirms that prior to the time of the procedure; I have explained to the patient and/or his/her legal representative, the risks and benefits involved in the proposed treatment and any reasonable alternative to the proposed treatment. I have also explained the risks and benefits involved in refusal of the proposed treatment and alternatives to the proposed treatment and have answered the patient's  questions. If I have a significant financial interest in a co-management agreement or a significant financial interest in any product or implant, or other significant relationship used in this procedure/surgery, I have disclosed this and had a discussion with my patient.     _____________________________________________________              _____________________________  (Signature of Physician)                                                                                         (Date)                                   (Time)  Patient Name: Kristin Hebert      : 1950      Printed: 2025     Medical Record #: [id number]                                      Page 1 of 1

## (undated) NOTE — LETTER
Patient Name: Kristin Hebert : 1950  Medical Record #: [id number] Printed: 2025  Page 1 of 2                                          [facility]  [address], [place]  Autorización para operación y procedimiento quirúrgico                                                                                                      Por la presente, autorizo a Kaitlin Lee MD, mi médico y al asistente a realizar la operación/procedimiento quirúrgico a continuación, así makenzie a administrar la anestesia que determine necesaria mi médico Nombre (s) de la operación/procedimiento: LAPAROSCOPIC APPENDECTOMY en Kristin Hebert     Reconozco que bethany la operación/procedimiento quirúrgico, las condiciones imprevistas pueden requerir de procedimientos adicionales o diferentes a aquellos mencionados anteriormente.  Por lo tanto, autorizo y solicito además que el cirujano antes mencionado, los asistentes o las personas designadas realicen los procedimientos que, a zuniga juicio, solange necesarios y convenientes.    Mi cirujano/médico solis discutido antes de mi cirugía los posibles beneficios, riesgos y efectos secundarios de gera procedimiento, la probabilidad de alcanzar las metas y los posibles problemas que puedan ocurrir bethany la recuperación.  Ellos también melgar discutido las alternativas razonables al procedimiento, incluso los riesgos, beneficios y efectos secundarios relacionados con las alternativas y los riesgos relacionados con no realizar gera procedimiento.  Melgar respondido a todas mis preguntas y confirmo que no se ha dado ninguna garantía en cuanto a los resultados que pueda obtener.    En joanie de que surja la necesidad bethany mi operación o bethany el periodo postoperatorio, también autorizo se aplique mateus y/o productos sanguíneos.  Asimismo, entiendo que a pesar de las cuidadosas pruebas y análisis de mateus o de los productos sanguíneos que realizan las entidades recolectoras, todavía  puedo estar sujeto a efectos adversos makenzie resultado de recibir keny transfusión de mateus y/o productos sanguíneos.  A continuación se mencionan algunos, aunque no todos, los riesgos potenciales que pueden ocurrir: fiebre y reacciones alérgicas, reacciones hemolíticas, trasmisión de enfermedades makenzie hepatitis, SIDA y citomegalovirus (CMV), así makenzie sobrecarga de líquidos.   En joanie de que desee tener keny transfusión autóloga de mi propia mateus o keny transfusión de un donante dirigido.  Lo discutiré con mi médico.  Check only if Refusing Blood or Blood Products  I understand refusal of blood or blood products as deemed necessary by my physician may have serious consequences to my condition to include possible death. I hereby assume responsibility for my refusal and release the hospital, its personnel, and my physicians from any responsibility for the consequences of my refusal.           o  Refuse       Autorizo el uso de cualquier muestra, órgano, tejido, parte del cuerpo u objeto extraño que pueda ser extraído de mi cuerpo bethany la operación/procedimiento para fines de diagnóstico, investigación o de enseñanza y zuniga desecho posterior por las autoridades del hospital.  También, autorizo la revelación de los resultados de las pruebas de muestras y/o los informes escritos al médico tratante mkaenzie personal médico del hospital u otros médicos de referencia o consulta involucrados en mi atención, a discreción del patólogo o de mi médico tratante.    Doy consentimiento para que se fotografíen o graben vídeos de las operaciones o procedimientos a realizarse, incluidas las partes de mi cuerpo que solange adecuadas para propósitos médicos, científicos o educativos, en el entendido de que, mi identidad no sea revelada por las fotografías o por textos descriptivos que las acompañen.  Si el procedimiento es fotografiado o grabado en vídeo, el cirujano obtendrá la imagen, cinta de vídeo o CD original.  El hospital no se hará  responsable por el almacenamiento, la revelación o el mantenimiento de la imagen, cinta o CD.    Autorizo la presencia de un especialista de producto u observadores en el quirófano, según lo considere necesario mi médico o las personas que éste designe.     Reconozco que en joanie de que mi procedimiento resulte en un tiempo prolongado de radiografía/fluoroscopia, puedo desarrollar keny reacción en la piel.    Si tengo keny orden de No intentar la reanimación (DERIC), teofilo estado se suspenderá mientras esté en el quirófano, la vernon de procedimientos y bethany el periodo de recuperación a menos que yo indique lo contrario explícitamente (o keny persona autorizada a soni el consentimiento en mi nombre). El cirujano o mi médico tratante determinarán cuándo termina el periodo de recuperación aplicable a los efectos de restablecer la orden de DERIC.  Pacientes que se realizan un procedimiento de esterilización: Entiendo que si el procedimiento tiene éxito, los resultados serán permanentes y que, por lo tanto, me será imposible inseminar, concebir o tener hijos.  Asimismo, entiendo que el procedimiento tiene makenzie propósito la esterilidad, aunque el resultado no está garantizado.   Admito que mi médico me ha explicado la aplicación de sedación/analgesia, incluidos los riesgos y beneficios y, consiento a la administración de sedación/analgesia conforme sea necesario o conveniente a juicio de mi médico.      CERTIFICO QUE HE LEÍDO Y COMPRENDIDO EL CONSENTIMIENTO ANTERIOR PARA LA OPERACIÓN y/o PROCEDIMIENTO.             ______________________________________  _______________________________  Firma del paciente      Firma de la persona responsible  Signature of patient      Signature of responsible person                        ___________________________________                                   Nombre en imprenta de la persona responsible         Name of responsible person          ___________________________________                  Relación con el paciente         Relationship to patient    _________________________________________  ______________ ________________  Firma del testigo          Fecha / Date Hora / Time  Signature of witness    DECLARACÓN DEL MÉDICO Mediante mi firma al calce, afirmo que antes de la hora del procedimiento, he explicadoal paciente y/o a zuniga representante legal, los riesgos y beneficios involucrados en el tratamiento propuesto, así comocualquier alternativa razonable al tratamiento propuesto. También le(s) he explicado los riesgos y beneficios involucradosen el rechazo del tratarniento propuesto y alternativas al tratamiento propuesto, y he respondido a las preguntas del(la) paciente(My signature below affirms that prior to the time of the procedure, I have explained to the patient and/or his/her guardian, therisks and benefits involved in the proposed treatment and any reasonable alternative to the proposed treatment. I have alsoexplained the risks and benefits involved in refusal of the proposed treatment and have answered the patient's questions.)    ________________________________________   _________________________   _____________   (Firma del médico/Signature of Physician)                                    (Fecha/Date)                                             (Hora/Time)      Patient Name: Kristin Hebert     : 1950                 Printed: 2025      Medical Record #: [id number]                                              Page 2 of 2

## (undated) NOTE — LETTER
[facility]  Administration of Anesthesia  Kristin REBOLLEDO agree to be cared for by a physician anesthesiologist alone and/or with a nurse anesthetist, who is specially trained to monitor me and give me medicine to put me to sleep or keep me comfortable during my procedure    I understand that my anesthesiologist and/or anesthetist is not an employee or agent of [facility] or [employer]. He or she works for [facility]    As the patient asking for anesthesia services, I agree to:  Allow the anesthesiologist (anesthesia doctor) to give me medicine and do additional procedures as necessary. Some examples are: Starting or using an “IV” to give me medicine, fluids or blood during my procedure, and having a breathing tube placed to help me breathe when I’m asleep (intubation). In the event that my heart stops working properly, I understand that my anesthesiologist will make every effort to sustain my life, unless otherwise directed by [facility] Do Not Resuscitate documents.  Tell my anesthesia doctor before my procedure:  If I am pregnant.  The last time that I ate or drank.  iii. All of the medicines I take (including prescriptions, herbal supplements, and pills I can buy without a prescription (including street drugs/illegal medications). Failure to inform my anesthesiologist about these medicines may increase my risk of anesthetic complications.  iv.If I am allergic to anything or have had a reaction to anesthesia before.  I understand how the anesthesia medicine will help me (benefits).  I understand that with any type of anesthesia medicine there are risks:  The most common risks are: nausea, vomiting, sore throat, muscle soreness, damage to my eyes, mouth, or teeth (from breathing tube placement).  Rare risks include: remembering what happened during my procedure, allergic reactions to medications, injury to my airway, heart, lungs, vision, nerves, or  muscles and in extremely rare instances death.  My doctor has explained to me other choices available to me for my care (alternatives).  Pregnant Patients (“epidural”):  I understand that the risks of having an epidural (medicine given into my back to help control pain during labor), include itching, low blood pressure, difficulty urinating, headache or slowing of the baby’s heart. Very rare risks include infection, bleeding, seizure, irregular heart rhythms and nerve injury.  Regional Anesthesia (“spinal”, “epidural”, & “nerve blocks”):  I understand that rare but potential complications include headache, bleeding, infection, seizure, irregular heart rhythms, and nerve injury.    _____________________________________________________________________________  Patient (or Representative) Signature/Relationship to Patient  Date   Time    _____________________________________________________________________________   Name (if used)    Language/Organization   Time    _____________________________________________________________________________  Nurse Anesthetist Signature     Date   Time  _____________________________________________________________________________  Anesthesiologist Signature     Date   Time  I have discussed the procedure and information above with the patient (or patient’s representative) and answered their questions. The patient or their representative has agreed to have anesthesia services.    _____________________________________________________________________________  Witness        Date   Time  I have verified that the signature is that of the patient or patient’s representative, and that it was signed before the procedure  Patient Name: Kristin Hebert     : 1950                 Printed: 2025 at 5:13 PM    Medical Record #: [id number]                                            Page 1 of 1  ----------ANESTHESIA CONSENT----------